# Patient Record
Sex: FEMALE | Race: BLACK OR AFRICAN AMERICAN | Employment: UNEMPLOYED | URBAN - METROPOLITAN AREA
[De-identification: names, ages, dates, MRNs, and addresses within clinical notes are randomized per-mention and may not be internally consistent; named-entity substitution may affect disease eponyms.]

---

## 2018-03-07 ENCOUNTER — OFFICE VISIT (OUTPATIENT)
Dept: SURGERY | Age: 68
End: 2018-03-07

## 2018-03-07 VITALS
HEIGHT: 68 IN | SYSTOLIC BLOOD PRESSURE: 167 MMHG | DIASTOLIC BLOOD PRESSURE: 85 MMHG | WEIGHT: 293 LBS | TEMPERATURE: 99 F | HEART RATE: 89 BPM | OXYGEN SATURATION: 100 % | BODY MASS INDEX: 44.41 KG/M2

## 2018-03-07 DIAGNOSIS — N18.6 CKD (CHRONIC KIDNEY DISEASE) STAGE V REQUIRING CHRONIC DIALYSIS (HCC): Primary | ICD-10-CM

## 2018-03-07 DIAGNOSIS — Z99.2 CKD (CHRONIC KIDNEY DISEASE) STAGE V REQUIRING CHRONIC DIALYSIS (HCC): Primary | ICD-10-CM

## 2018-03-07 PROBLEM — E66.01 OBESITY, MORBID (HCC): Status: ACTIVE | Noted: 2018-03-07

## 2018-03-07 RX ORDER — ROSUVASTATIN CALCIUM 10 MG/1
10 TABLET, COATED ORAL
COMMUNITY

## 2018-03-07 RX ORDER — FUROSEMIDE 80 MG/1
80 TABLET ORAL DAILY
COMMUNITY

## 2018-03-07 RX ORDER — ACETAMINOPHEN 325 MG/1
650 TABLET ORAL
COMMUNITY

## 2018-03-07 RX ORDER — LANOLIN ALCOHOL/MO/W.PET/CERES
CREAM (GRAM) TOPICAL
COMMUNITY
End: 2020-09-28 | Stop reason: ALTCHOICE

## 2018-03-07 RX ORDER — GABAPENTIN 100 MG/1
CAPSULE ORAL 3 TIMES DAILY
COMMUNITY

## 2018-03-07 RX ORDER — GUAIFENESIN 100 MG/5ML
200 SOLUTION ORAL
COMMUNITY

## 2018-03-07 RX ORDER — HYDRALAZINE HYDROCHLORIDE 100 MG/1
TABLET, FILM COATED ORAL 2 TIMES DAILY
COMMUNITY

## 2018-03-07 RX ORDER — CLONIDINE HYDROCHLORIDE 0.1 MG/1
TABLET ORAL 2 TIMES DAILY
COMMUNITY
End: 2020-09-28 | Stop reason: ALTCHOICE

## 2018-03-07 RX ORDER — MIRTAZAPINE 15 MG/1
TABLET, FILM COATED ORAL
COMMUNITY
End: 2019-02-07 | Stop reason: CLARIF

## 2018-03-07 RX ORDER — ASPIRIN 81 MG/1
TABLET ORAL DAILY
COMMUNITY
End: 2020-09-28 | Stop reason: ALTCHOICE

## 2018-03-07 RX ORDER — CLOPIDOGREL BISULFATE 75 MG/1
TABLET ORAL
COMMUNITY
End: 2019-02-07 | Stop reason: CLARIF

## 2018-03-07 RX ORDER — NYSTATIN 100000 [USP'U]/G
POWDER TOPICAL 4 TIMES DAILY
COMMUNITY
End: 2019-02-07 | Stop reason: CLARIF

## 2018-03-07 RX ORDER — ALBUTEROL SULFATE 0.83 MG/ML
SOLUTION RESPIRATORY (INHALATION) ONCE
COMMUNITY
End: 2020-09-28 | Stop reason: ALTCHOICE

## 2018-03-07 RX ORDER — FACIAL-BODY WIPES
10 EACH TOPICAL DAILY
COMMUNITY
End: 2020-09-28 | Stop reason: CLARIF

## 2018-03-07 RX ORDER — CARVEDILOL 6.25 MG/1
12.5 TABLET ORAL 2 TIMES DAILY WITH MEALS
COMMUNITY

## 2018-03-07 RX ORDER — ESOMEPRAZOLE MAGNESIUM 40 MG/1
CAPSULE, DELAYED RELEASE ORAL DAILY
COMMUNITY
End: 2019-02-07 | Stop reason: CLARIF

## 2018-03-07 RX ORDER — INSULIN GLARGINE 100 [IU]/ML
12 INJECTION, SOLUTION SUBCUTANEOUS
Status: ON HOLD | COMMUNITY
End: 2018-12-12 | Stop reason: SDUPTHER

## 2018-03-07 RX ORDER — DOCUSATE SODIUM 100 MG/1
100 CAPSULE, LIQUID FILLED ORAL 2 TIMES DAILY
COMMUNITY
End: 2019-02-07 | Stop reason: CLARIF

## 2018-03-07 NOTE — MR AVS SNAPSHOT
[address] [place] [address] 
[phone] Patient: Elvira Mann MRN: [id number] YMB:[id number] Visit Information Date & Time Provider Department Dept. Phone Encounter #  
 3/7/2018 10:00 AM Mario Velazquez MD [facility] [address] [phone] [id number] Allergies as of 3/7/2018  Never Reviewed Severity Noted Reaction Type Reactions Sulfadiazine  03/07/2018    Other (comments) Current Immunizations  Never Reviewed No immunizations on file. Not reviewed this visit Vitals BP Pulse Temp Height(growth percentile) Weight(growth percentile) SpO2  
 167/85 (BP 1 Location: Right arm, BP Patient Position: Sitting) 89 99 °F (37.2 °C) 5' 7.99\" (1.727 m) 315 lb (142.9 kg) 100% BMI Smoking Status 47.91 kg/m2 Never Assessed Vitals History BMI and BSA Data Body Mass Index Body Surface Area  
 47.91 kg/m 2 2.62 m 2 Your Updated Medication List  
  
   
This list is accurate as of 3/7/18 12:08 PM.  Always use your most recent med list.  
  
  
  
  
 acetaminophen 325 mg tablet Commonly known as:  TYLENOL Take 650 mg by mouth every four (4) hours as needed for Pain. albuterol 2.5 mg /3 mL (0.083 %) nebulizer solution Commonly known as:  PROVENTIL VENTOLIN  
by Nebulization route once. alteplase 2 mg injection Commonly known as:  CATHFLO  
by InterCATHeter route once. aspirin delayed-release 81 mg tablet Take  by mouth daily. bisacodyl 10 mg suppository Commonly known as:  DULCOLAX Insert 10 mg into rectum daily. carvedilol 6.25 mg tablet Commonly known as:  [place] Members Take  by mouth two (2) times daily (with meals). cloNIDine HCl 0.1 mg tablet Commonly known as:  CATAPRES Take  by mouth two (2) times a day. clopidogrel 75 mg Tab Commonly known as:  PLAVIX Take  by mouth. docusate sodium 100 mg capsule Commonly known as:  Carletha Benne Take 100 mg by mouth two (2) times a day. esomeprazole 40 mg capsule Commonly known as:  Deadra Martyr Take  by mouth daily. ferrous sulfate 325 mg (65 mg iron) tablet Take  by mouth Daily (before breakfast). furosemide 80 mg tablet Commonly known as:  LASIX Take  by mouth daily. gabapentin 100 mg capsule Commonly known as:  NEURONTIN Take  by mouth three (3) times daily. guaiFENesin 100 mg/5 mL liquid Commonly known as:  ROBITUSSIN Take 200 mg by mouth three (3) times daily as needed for Cough. hydrALAZINE 100 mg tablet Commonly known as:  APRESOLINE Take  by mouth. insulin glargine 100 unit/mL injection Commonly known as:  LANTUS  
20 Units by SubCUTAneous route nightly. INSULIN LISPRO SC  
10 Units by SubCUTAneous route three (3) times daily. ipratropium 17 mcg/actuation inhaler Commonly known as:  ATROVENT HFA Take 1 Puff by inhalation. mirtazapine 15 mg tablet Commonly known as:  Claria Chick Take  by mouth nightly. nystatin powder Commonly known as:  MYCOSTATIN Apply  to affected area four (4) times daily. rosuvastatin 10 mg tablet Commonly known as:  CRESTOR Take 10 mg by mouth nightly.  
  
 sodium chloride-aloe vera topical gel Commonly known as:  AYR Apply  to affected area once. Introducing [facility]! Yumiko Ray introduces [employer] patient portal. Now you can access parts of your medical record, email your doctor's office, and request medication refills online. 1. In your internet browser, go to [email or web address] 2. Click on the First Time User? Click Here link in the Sign In box. You will see the New Member Sign Up page. 3. Enter your [employer] Access Code exactly as it appears below. You will not need to use this code after youve completed the sign-up process.  If you do not sign up before the expiration date, you must request a new code. · [employer] Access Code: [email or web address] Expires: 6/5/2018 12:08 PM 
 
4. Enter the last four digits of your Social Security Number (xxxx) and Date of Birth (mm/dd/yyyy) as indicated and click Submit. You will be taken to the next sign-up page. 5. Create a [employer] ID. This will be your [employer] login ID and cannot be changed, so think of one that is secure and easy to remember. 6. Create a [employer] password. You can change your password at any time. 7. Enter your Password Reset Question and Answer. This can be used at a later time if you forget your password. 8. Enter your e-mail address. You will receive e-mail notification when new information is available in [address] 9. Click Sign Up. You can now view and download portions of your medical record. 10. Click the Download Summary menu link to download a portable copy of your medical information. If you have questions, please visit the Frequently Asked Questions section of the [employer] website. Remember, [employer] is NOT to be used for urgent needs. For medical emergencies, dial 911. Now available from your iPhone and Android! Please provide this summary of care documentation to your next provider. If you have any questions after today's visit, please call [phone].

## 2018-03-07 NOTE — PROGRESS NOTES
Neli Sherman  3/7/18      The patient is a 75 y.o. woman referred by Arturo Irby MD regarding dialysis access. The patient is presently dialyzed by way of a central catheter. The patient has a history of congestive heart failure, diabetes mellitus, hypertension. The patient was on a stretcher when I saw her. She reports that she is unable to lie down flat. She sleeps in a partially sitting up position. She does use home oxygen. Her shortness of breath when lying down flat is not improved after dialysis. The patient has seen a cardiologist Dr. Caroline Pichardo, telephone [phone]. Physical Examination:   GENERAL:  Patient is an extremely obese woman. Visit Vitals    /85 (BP 1 Location: Right arm, BP Patient Position: Sitting)    Pulse 89    Temp 99 °F (37.2 °C)    Ht 5' 7.99\" (1.727 m)    Wt 142.9 kg (315 lb)    SpO2 100%  Comment: O2 2LPM    BMI 47.91 kg/m2   HEAD/NECK:  No jaundice, mass or thyromegaly. There is a right sided central catheter which appears to have right internal jugular vein entry. LUNGS:  Clear bilaterally without wheeze, rhonchi or rales, but sounds are somewhat distant. HEART:  Regular without murmur, gallop or rub. NEURO:  Patient is alert and oriented.     EXTREMITIES: Right and left radial and ulnar pulses were 2+. Palmar arches intact to doppler exam bilaterally. Duplex ultrasound was obtained of the upper extremity veins for dialysis access planning. On the right side, there is a small median antebrachial vein which is joined by a small deep tributary. It gives off a right cephalic vein into the upper arm which is 2.1 mm diameter. The basilic tributary is initially 2.8 mm diameter passing anterior to the brachial artery. It then becomes slightly smaller in diameter before joining a more posterior tributary to form the basilic vein trunk. The basilic vein on the right is approximately 2 mm in diameter.  On the right at the antecubital there is a brachial vein which lies lateral to the brachial artery and is 5 mm diameter. It passes anterior and then medial to the brachial artery proceeding up the upper arm and is 4.9 mm in diameter in the lower third of the upper arm and is 5.7 mm diameter most proximally. On the left side median antebrachial vein is 2.6 mm diameter. It is joined by a small deep tributary and gives off basilic tributary which is 3.1 mm diameter and cephalic vein which is 3.3 mm diameter. The left cephalic vein in the upper arm is 4.1 mm diameter. The left basilic tributary passing anterior to the brachial artery is 3.0 mm diameter. It joins a 3.6 mm posterior tributary to form the basilic vein trunk. The basilic vein is 4.5 mm diameter in its mid portion and 5.0 mm diameter more proximally. With respect to anatomy, the patient's left arm veins appear to be more suitable for creation of arteriovenous fistula. I reviewed with the patient and her family options for hemodialysis access which would include central catheter, arteriovenous fistula and arteriovenous graft. The ideal access would be an arteriovenous fistula as it represents the best opportunity for longevity of the access and minimal complications, thrombotic and infectious. This procedure is her arm related to her anatomy would require a two-step process, first for creation of a fistula and second, several months later for transposition. Surgery, in particular, the transposition surgery, would require general anesthesia. Arteriovenous graft insertion could be done in the left arm. This would require typically a single operation and could be utilized about three weeks after surgery. However, the AV grafts have considerably higher risk of thrombosis and require more frequent intervention and have shorter total duration of use.      The patient has a central vein catheter, but long term use of catheters has the highest risk of infection and has additional risk of central vein stenosis or occlusion.      I have some concerns about the patient's ability to tolerate anesthesia. I will discuss her general health status with her nephrologist before making final recommendation to the patient. The patient, I think, is concerned about risk regarding general anesthesia and potential need for more than one surgical operation and for future interventions on any access.      I reviewed with the patient the typical operative and post operative course for creation of AV fistula or insertion of arteriovenous graft. Risks versus benefits were reviewed with the patient of these procedures. Risks of surgery include bleeding, infection, failure of the access, ischemia of the hand, swelling of the arm, injury to vessels or nerves, risk of anesthesia, etc. The patient understands and will await further discussion before deciding on her preferred access.      Final Diagnosis: End-stage renal disease.     Jennifer/jarett                                                                                    cc:  Basim Mason MD

## 2018-03-07 NOTE — PROGRESS NOTES
1. Have you been to the ER, urgent care clinic since your last visit? Hospitalized since your last visit? New patient2. Have you seen or consulted any other health care providers outside of the [facility] since your last visit? Include any pap smears or colon screening. No.    Does not have advanced directive.

## 2018-03-29 PROBLEM — N18.6 CKD (CHRONIC KIDNEY DISEASE) STAGE V REQUIRING CHRONIC DIALYSIS (HCC): Status: ACTIVE | Noted: 2018-03-29

## 2018-03-29 PROBLEM — Z99.2 CKD (CHRONIC KIDNEY DISEASE) STAGE V REQUIRING CHRONIC DIALYSIS (HCC): Status: ACTIVE | Noted: 2018-03-29

## 2018-04-05 ENCOUNTER — TELEPHONE (OUTPATIENT)
Dept: SURGERY | Age: 68
End: 2018-04-05

## 2018-11-19 ENCOUNTER — OFFICE VISIT (OUTPATIENT)
Dept: SURGERY | Age: 68
End: 2018-11-19

## 2018-11-19 VITALS
HEIGHT: 68 IN | SYSTOLIC BLOOD PRESSURE: 171 MMHG | TEMPERATURE: 98.1 F | DIASTOLIC BLOOD PRESSURE: 84 MMHG | OXYGEN SATURATION: 95 % | BODY MASS INDEX: 44.41 KG/M2 | WEIGHT: 293 LBS | HEART RATE: 87 BPM

## 2018-11-19 DIAGNOSIS — N18.6 CKD (CHRONIC KIDNEY DISEASE) STAGE V REQUIRING CHRONIC DIALYSIS (HCC): Primary | ICD-10-CM

## 2018-11-19 DIAGNOSIS — Z99.2 CKD (CHRONIC KIDNEY DISEASE) STAGE V REQUIRING CHRONIC DIALYSIS (HCC): Primary | ICD-10-CM

## 2018-11-19 RX ORDER — LANSOPRAZOLE 30 MG/1
30 CAPSULE, DELAYED RELEASE ORAL
COMMUNITY
End: 2020-09-28 | Stop reason: ALTCHOICE

## 2018-11-19 RX ORDER — TRAMADOL HYDROCHLORIDE 50 MG/1
50 TABLET ORAL
COMMUNITY
End: 2020-09-28 | Stop reason: ALTCHOICE

## 2018-11-19 NOTE — PROGRESS NOTES
The patient is a 70 y.o. woman who was last seen on 3/17/18 related to dialysis access. She was referred by Dr. Fabiano Barnes. The patient since that visit has been evaluated by her pulmonologist on 11/9/18 and by her cardiologist on 9/7/18. She was felt to have intermediate cardiac risk and to be an acceptable risk from a pulmonary standpoint for the surgery. The patient does report that she has shortness of breath when she attempts to lie down, even if she is lying on her side. She does use oxygen at home at night. The patient denies anginal chest pain. Physical Examination:   Visit Vitals  /84 (BP 1 Location: Left arm, BP Patient Position: Sitting)   Pulse 87   Temp 98.1 °F (36.7 °C)   Ht 5' 7.99\" (1.727 m)   Wt 133.8 kg (295 lb)   SpO2 95%   BMI 44.87 kg/m²   GENERAL:  Alert overweight woman in no acute distress. EXTREMITIES: Examination of the left upper extremity reveals 2+ radial and ulnar pulses. I repeated the duplex ultrasound evaluation of the veins of the left upper extremity. There is a small median antebrachial vein 2.1 mm diameter which gives off a cephalic vein also 2.1 mm diameter and a basilic tributary which initially is 2.8 mm diameter passing anterior to the brachial artery. It becomes slightly smaller in diameter before joining a more posterior tributary to form the basilic vein trunk. The basilic vein trunk on the left distally was 4 mm diameter and more proximally was 5 mm diameter. I would plan to reassess the veins with ultrasound at surgery after general anesthesia. I would then make a determination as to whether to proceed with creation of arteriovenous fistula, which would be the first of two staged operations or whether to proceed with insertion of an arteriovenous graft. I reviewed with the patient the two alternatives for arteriovenous access in the arm and the factors in deciding which to employ.      I spoke with Dr. Rohith Mccarthy today and she confirmed that she felt that arteriovenous access surgery in the upper extremity was warranted. I explained to the patient that I think she does have elevated operative risk related to her heart and lung function. Risks versus benefits were reviewed with the patient. Risks of surgery include bleeding, infection, failure of the fistula or graft, ischemia of the hand or arm, swelling of the hand or arm, injury to vessels or nerves, risk of anesthesia including risk of respiratory failure, myocardial infarction, stroke, etc. The patient understands and wishes to proceed. Final Diagnosis:  End-stage renal disease. c:Chucky Barnes MD    Total Evaluation and Management time utilized (excluding any procedure time)  was 28 minutes, with 55 % in counseling and/or coordination of care.     Elliott Perez MD

## 2018-11-20 ENCOUNTER — TELEPHONE (OUTPATIENT)
Dept: SURGERY | Age: 68
End: 2018-11-20

## 2018-11-20 NOTE — TELEPHONE ENCOUNTER
Spoke with Ms Eric Hernandez. Pt gave verbal permission to speak with son, Lesli Arrington. Surgery scheduled for Friday, 12/7/18 at 10:00 am, arrival time is 8:00 am.  Pt will take last dose of Plavix & aspirin on Saturday, 12/1/18. Faxed surgery information & post op appt to dialysis at (f) [phone], confirmation received. Dialysis will arrange transportation for pt.

## 2018-12-05 NOTE — PERIOP NOTES
[facility]  Preoperative Instructions        Surgery Date 12/7/18          Time of Arrival 8:00AM     1. On the day of your surgery, please report to the Surgical Services Registration Desk and sign in at your designated time. The Surgery Center is located to the right of the Emergency Room. 2. You must have someone with you to drive you home. You should not drive a car for 24 hours following surgery. Please make arrangements for a friend or family member to stay with you for the first 24 hours after your surgery. 3. Do not have anything to eat or drink (including water, gum, mints, coffee, juice) after midnight 12/6/18 . ? This may not apply to medications prescribed by your physician. ?(Please note below the special instructions with medications to take the morning of your procedure.)    4. We recommend you do not drink any alcoholic beverages for 24 hours before and after your surgery. 5. Contact your surgeons office for instructions on the following medications: non-steroidal anti-inflammatory drugs (i.e. Advil, Aleve), vitamins, and supplements. (Some surgeons will want you to stop these medications prior to surgery and others may allow you to take them)  **If you are currently taking Plavix, Coumadin, Aspirin and/or other blood-thinning agents, contact your surgeon for instructions. ** Your surgeon will partner with the physician prescribing these medications to determine if it is safe to stop or if you need to continue taking. Please do not stop taking these medications without instructions from your surgeon    6. Wear comfortable clothes. Wear glasses instead of contacts. Do not bring any money or jewelry. Please bring picture ID, insurance card, and any prearranged co-payment or hospital payment. Do not wear make-up, particularly mascara the morning of your surgery. Do not wear nail polish, particularly if you are having foot /hand surgery.   Wear your hair loose or down, no ponytails, buns, vanessa pins or clips. All body piercings must be removed. Please shower with antibacterial soap for three consecutive days before and on the morning of surgery, but do not apply any lotions, powders or deodorants after the shower on the day of surgery. Please use a fresh towels after each shower. Please sleep in clean clothes and change bed linens the night before surgery. Please do not shave for 48 hours prior to surgery. Shaving of the face is acceptable. 7. You should understand that if you do not follow these instructions your surgery may be cancelled. If your physical condition changes (I.e. fever, cold or flu) please contact your surgeon as soon as possible. 8. It is important that you be on time. If a situation occurs where you may be late, please call [phone] (OR Holding Area). 9. If you have any questions and or problems, please call [phone] (Pre-admission Testing). 10. Your surgery time may be subject to change. You will receive a phone call the evening prior if your time changes. 11.  If having outpatient surgery, you must have someone to drive you here, stay with you during the duration of your stay, and to drive you home at time of discharge. 12.   In an effort to improve the efficiency, privacy, and safety for all of our Pre-op patients visitors are not allowed in the Holding area. Once you arrive and are registered your family/visitors will be asked to remain in the waiting room. The Pre-op staff will get you from the Surgical Waiting Area and will explain to you and your family/visitors that the Pre-op phase is beginning. The staff will answer any questions and provide instructions for tracking of the patient, by use of the existing tracking number and color-coded status board in the waiting room.   At this time the staff will also ask for your designated spokesperson information in the event that the physician or staff need to provide an update or obtain any pertinent information. The designated spokesperson will be notified if the physician needs to speak to family during the pre-operative phase. If at any time your family/visitors has questions or concerns they may approach the volunteer desk in the waiting area for assistance. Special Instructions:  Pt held Plavix (12/1/18) as directed by surgeon. Pt stopped Aspirin on 12/4/18. Dr Keven Mcintosh office called and made Kerri Sanchez aware pt did not stop Aspirin on 12/1, Kerri Sanchez to make Dr Ayde Esquivel aware and call PAT w any changes. Nebulizer/inhaler tx as needed    Call holding with any questions regarding blood sugar    MEDICATIONS TO TAKE THE MORNING OF SURGERY WITH A SIP OF WATER: Coreg, Clonidine, Lasix, Hydralazine. Also Nexium,  Gabapentin,Tylenol, Ultram, Prevacid as needed      I understand a pre-operative phone call will be made to verify my surgery time. In the event that I am not available, I give permission for a message to be left on my answering service and/or with another person?   Yes          ___________________      __________   _________    (Signature of Patient)             (Witness)                (Date and Time)

## 2018-12-05 NOTE — PERIOP NOTES
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      Zane Williamson to consult Dr Jorge Langley and call SUHAIL to advise

## 2018-12-06 NOTE — PERIOP NOTES
Hernandez Carrier called back regarding patients history of MRSA. Telephone ordered received and read by for vancomycin 1.5gms vancomycin iv to be give in preop.

## 2018-12-07 ENCOUNTER — ANESTHESIA EVENT (OUTPATIENT)
Dept: SURGERY | Age: 68
End: 2018-12-07
Payer: MEDICARE

## 2018-12-07 ENCOUNTER — HOSPITAL ENCOUNTER (OUTPATIENT)
Age: 68
Setting detail: OBSERVATION
Discharge: HOME | End: 2018-12-12
Attending: SURGERY | Admitting: INTERNAL MEDICINE
Payer: MEDICARE

## 2018-12-07 ENCOUNTER — ANESTHESIA (OUTPATIENT)
Dept: SURGERY | Age: 68
End: 2018-12-07
Payer: MEDICARE

## 2018-12-07 ENCOUNTER — APPOINTMENT (OUTPATIENT)
Dept: GENERAL RADIOLOGY | Age: 68
End: 2018-12-07
Attending: INTERNAL MEDICINE
Payer: MEDICARE

## 2018-12-07 DIAGNOSIS — Z99.2 CKD (CHRONIC KIDNEY DISEASE) STAGE V REQUIRING CHRONIC DIALYSIS (HCC): Primary | ICD-10-CM

## 2018-12-07 DIAGNOSIS — N18.6 CKD (CHRONIC KIDNEY DISEASE) STAGE V REQUIRING CHRONIC DIALYSIS (HCC): Primary | ICD-10-CM

## 2018-12-07 PROBLEM — R73.9 HYPERGLYCEMIA: Status: ACTIVE | Noted: 2018-12-07

## 2018-12-07 LAB
ALBUMIN SERPL-MCNC: 3.2 G/DL (ref 3.5–5)
ANION GAP SERPL CALC-SCNC: 5 MMOL/L (ref 5–15)
BUN SERPL-MCNC: 40 MG/DL (ref 6–20)
BUN/CREAT SERPL: 9 (ref 12–20)
CALCIUM SERPL-MCNC: 8.2 MG/DL (ref 8.5–10.1)
CHLORIDE SERPL-SCNC: 93 MMOL/L (ref 97–108)
CO2 SERPL-SCNC: 31 MMOL/L (ref 21–32)
CREAT SERPL-MCNC: 4.5 MG/DL (ref 0.55–1.02)
ERYTHROCYTE [DISTWIDTH] IN BLOOD BY AUTOMATED COUNT: 14.9 % (ref 11.5–14.5)
GLUCOSE BLD STRIP.AUTO-MCNC: 340 MG/DL (ref 65–100)
GLUCOSE BLD STRIP.AUTO-MCNC: 399 MG/DL (ref 65–100)
GLUCOSE BLD STRIP.AUTO-MCNC: 447 MG/DL (ref 65–100)
GLUCOSE BLD STRIP.AUTO-MCNC: 453 MG/DL (ref 65–100)
GLUCOSE BLD STRIP.AUTO-MCNC: 465 MG/DL (ref 65–100)
GLUCOSE BLD STRIP.AUTO-MCNC: 476 MG/DL (ref 65–100)
GLUCOSE BLD STRIP.AUTO-MCNC: 485 MG/DL (ref 65–100)
GLUCOSE BLD STRIP.AUTO-MCNC: 518 MG/DL (ref 65–100)
GLUCOSE SERPL-MCNC: 477 MG/DL (ref 65–100)
HCT VFR BLD AUTO: 32.7 % (ref 35–47)
HGB BLD-MCNC: 10.3 G/DL (ref 11.5–16)
MCH RBC QN AUTO: 29.3 PG (ref 26–34)
MCHC RBC AUTO-ENTMCNC: 31.5 G/DL (ref 30–36.5)
MCV RBC AUTO: 92.9 FL (ref 80–99)
NRBC # BLD: 0 K/UL (ref 0–0.01)
NRBC BLD-RTO: 0 PER 100 WBC
PHOSPHATE SERPL-MCNC: 4.3 MG/DL (ref 2.6–4.7)
PLATELET # BLD AUTO: 191 K/UL (ref 150–400)
PMV BLD AUTO: 11.3 FL (ref 8.9–12.9)
POTASSIUM SERPL-SCNC: 4.4 MMOL/L (ref 3.5–5.1)
RBC # BLD AUTO: 3.52 M/UL (ref 3.8–5.2)
SERVICE CMNT-IMP: ABNORMAL
SODIUM SERPL-SCNC: 129 MMOL/L (ref 136–145)
WBC # BLD AUTO: 6.3 K/UL (ref 3.6–11)

## 2018-12-07 PROCEDURE — G0378 HOSPITAL OBSERVATION PER HR: HCPCS

## 2018-12-07 PROCEDURE — 77030033269 HC SLV COMPR SCD KNE2 CARD -B

## 2018-12-07 PROCEDURE — 80069 RENAL FUNCTION PANEL: CPT

## 2018-12-07 PROCEDURE — 96372 THER/PROPH/DIAG INJ SC/IM: CPT

## 2018-12-07 PROCEDURE — 71045 X-RAY EXAM CHEST 1 VIEW: CPT

## 2018-12-07 PROCEDURE — 74011250636 HC RX REV CODE- 250/636

## 2018-12-07 PROCEDURE — 82962 GLUCOSE BLOOD TEST: CPT

## 2018-12-07 PROCEDURE — 74011636637 HC RX REV CODE- 636/637: Performed by: INTERNAL MEDICINE

## 2018-12-07 PROCEDURE — 74011250636 HC RX REV CODE- 250/636: Performed by: ANESTHESIOLOGY

## 2018-12-07 PROCEDURE — 36415 COLL VENOUS BLD VENIPUNCTURE: CPT

## 2018-12-07 PROCEDURE — 74011250636 HC RX REV CODE- 250/636: Performed by: INTERNAL MEDICINE

## 2018-12-07 PROCEDURE — 99218 HC RM OBSERVATION: CPT

## 2018-12-07 PROCEDURE — 80047 BASIC METABLC PNL IONIZED CA: CPT

## 2018-12-07 PROCEDURE — 74011636637 HC RX REV CODE- 636/637: Performed by: ANESTHESIOLOGY

## 2018-12-07 PROCEDURE — 85027 COMPLETE CBC AUTOMATED: CPT

## 2018-12-07 PROCEDURE — 74011250637 HC RX REV CODE- 250/637: Performed by: INTERNAL MEDICINE

## 2018-12-07 RX ORDER — VANCOMYCIN/0.9 % SOD CHLORIDE 1.5G/250ML
1500 PLASTIC BAG, INJECTION (ML) INTRAVENOUS ONCE
Status: DISCONTINUED | OUTPATIENT
Start: 2018-12-07 | End: 2018-12-07

## 2018-12-07 RX ORDER — SODIUM CHLORIDE 0.9 % (FLUSH) 0.9 %
5-10 SYRINGE (ML) INJECTION AS NEEDED
Status: DISCONTINUED | OUTPATIENT
Start: 2018-12-07 | End: 2018-12-12 | Stop reason: HOSPADM

## 2018-12-07 RX ORDER — CEFAZOLIN SODIUM 1 G/3ML
3 INJECTION, POWDER, FOR SOLUTION INTRAMUSCULAR; INTRAVENOUS ONCE
Status: DISCONTINUED | OUTPATIENT
Start: 2018-12-07 | End: 2018-12-07 | Stop reason: SDUPTHER

## 2018-12-07 RX ORDER — SODIUM CHLORIDE 9 MG/ML
25 INJECTION, SOLUTION INTRAVENOUS CONTINUOUS
Status: DISCONTINUED | OUTPATIENT
Start: 2018-12-07 | End: 2018-12-10

## 2018-12-07 RX ORDER — MAGNESIUM SULFATE 100 %
4 CRYSTALS MISCELLANEOUS AS NEEDED
Status: DISCONTINUED | OUTPATIENT
Start: 2018-12-07 | End: 2018-12-12 | Stop reason: HOSPADM

## 2018-12-07 RX ORDER — SODIUM CHLORIDE 0.9 % (FLUSH) 0.9 %
5-10 SYRINGE (ML) INJECTION EVERY 8 HOURS
Status: DISCONTINUED | OUTPATIENT
Start: 2018-12-07 | End: 2018-12-12 | Stop reason: HOSPADM

## 2018-12-07 RX ORDER — CARVEDILOL 6.25 MG/1
6.25 TABLET ORAL 2 TIMES DAILY WITH MEALS
Status: DISCONTINUED | OUTPATIENT
Start: 2018-12-07 | End: 2018-12-12 | Stop reason: HOSPADM

## 2018-12-07 RX ORDER — DEXTROSE 50 % IN WATER (D50W) INTRAVENOUS SYRINGE
12.5-25 AS NEEDED
Status: DISCONTINUED | OUTPATIENT
Start: 2018-12-07 | End: 2018-12-12 | Stop reason: HOSPADM

## 2018-12-07 RX ORDER — ONDANSETRON 2 MG/ML
4 INJECTION INTRAMUSCULAR; INTRAVENOUS
Status: DISCONTINUED | OUTPATIENT
Start: 2018-12-07 | End: 2018-12-12 | Stop reason: HOSPADM

## 2018-12-07 RX ORDER — GABAPENTIN 100 MG/1
100 CAPSULE ORAL 3 TIMES DAILY
Status: DISCONTINUED | OUTPATIENT
Start: 2018-12-07 | End: 2018-12-12 | Stop reason: HOSPADM

## 2018-12-07 RX ORDER — HYDRALAZINE HYDROCHLORIDE 50 MG/1
50 TABLET, FILM COATED ORAL 2 TIMES DAILY
Status: DISCONTINUED | OUTPATIENT
Start: 2018-12-07 | End: 2018-12-12 | Stop reason: HOSPADM

## 2018-12-07 RX ORDER — ASPIRIN 81 MG/1
81 TABLET ORAL DAILY
Status: DISCONTINUED | OUTPATIENT
Start: 2018-12-08 | End: 2018-12-12 | Stop reason: HOSPADM

## 2018-12-07 RX ORDER — INSULIN GLARGINE 100 [IU]/ML
10 INJECTION, SOLUTION SUBCUTANEOUS
Status: DISCONTINUED | OUTPATIENT
Start: 2018-12-07 | End: 2018-12-07

## 2018-12-07 RX ORDER — INSULIN GLARGINE 100 [IU]/ML
12 INJECTION, SOLUTION SUBCUTANEOUS
Status: DISCONTINUED | OUTPATIENT
Start: 2018-12-07 | End: 2018-12-11

## 2018-12-07 RX ORDER — HEPARIN SODIUM 5000 [USP'U]/ML
5000 INJECTION, SOLUTION INTRAVENOUS; SUBCUTANEOUS EVERY 12 HOURS
Status: DISCONTINUED | OUTPATIENT
Start: 2018-12-07 | End: 2018-12-07

## 2018-12-07 RX ORDER — HEPARIN SODIUM 5000 [USP'U]/ML
7500 INJECTION, SOLUTION INTRAVENOUS; SUBCUTANEOUS EVERY 12 HOURS
Status: DISCONTINUED | OUTPATIENT
Start: 2018-12-07 | End: 2018-12-12 | Stop reason: HOSPADM

## 2018-12-07 RX ORDER — INSULIN LISPRO 100 [IU]/ML
INJECTION, SOLUTION INTRAVENOUS; SUBCUTANEOUS
Status: DISCONTINUED | OUTPATIENT
Start: 2018-12-07 | End: 2018-12-12 | Stop reason: HOSPADM

## 2018-12-07 RX ORDER — CLONIDINE HYDROCHLORIDE 0.1 MG/1
0.1 TABLET ORAL 2 TIMES DAILY
Status: DISCONTINUED | OUTPATIENT
Start: 2018-12-07 | End: 2018-12-12 | Stop reason: HOSPADM

## 2018-12-07 RX ORDER — ACETAMINOPHEN 325 MG/1
650 TABLET ORAL
Status: DISCONTINUED | OUTPATIENT
Start: 2018-12-07 | End: 2018-12-12 | Stop reason: HOSPADM

## 2018-12-07 RX ORDER — CLOPIDOGREL BISULFATE 75 MG/1
75 TABLET ORAL DAILY
Status: DISCONTINUED | OUTPATIENT
Start: 2018-12-08 | End: 2018-12-12 | Stop reason: HOSPADM

## 2018-12-07 RX ADMIN — CARVEDILOL 6.25 MG: 6.25 TABLET, FILM COATED ORAL at 17:25

## 2018-12-07 RX ADMIN — SODIUM CHLORIDE 25 ML/HR: 900 INJECTION, SOLUTION INTRAVENOUS at 09:00

## 2018-12-07 RX ADMIN — HUMAN INSULIN 10 UNITS: 100 INJECTION, SOLUTION SUBCUTANEOUS at 10:11

## 2018-12-07 RX ADMIN — HEPARIN SODIUM 7500 UNITS: 5000 INJECTION INTRAVENOUS; SUBCUTANEOUS at 21:24

## 2018-12-07 RX ADMIN — Medication 10 ML: at 21:25

## 2018-12-07 RX ADMIN — INSULIN LISPRO 6 UNITS: 100 INJECTION, SOLUTION INTRAVENOUS; SUBCUTANEOUS at 21:23

## 2018-12-07 RX ADMIN — GABAPENTIN 100 MG: 100 CAPSULE ORAL at 17:25

## 2018-12-07 RX ADMIN — HUMAN INSULIN 10 UNITS: 100 INJECTION, SOLUTION SUBCUTANEOUS at 09:00

## 2018-12-07 RX ADMIN — GABAPENTIN 100 MG: 100 CAPSULE ORAL at 21:24

## 2018-12-07 RX ADMIN — INSULIN LISPRO 8 UNITS: 100 INJECTION, SOLUTION INTRAVENOUS; SUBCUTANEOUS at 17:24

## 2018-12-07 RX ADMIN — ACETAMINOPHEN 650 MG: 325 TABLET ORAL at 16:03

## 2018-12-07 RX ADMIN — ACETAMINOPHEN 650 MG: 325 TABLET ORAL at 21:23

## 2018-12-07 RX ADMIN — CLONIDINE HYDROCHLORIDE 0.1 MG: 0.1 TABLET ORAL at 17:25

## 2018-12-07 RX ADMIN — Medication 10 ML: at 15:57

## 2018-12-07 RX ADMIN — INSULIN GLARGINE 12 UNITS: 100 INJECTION, SOLUTION SUBCUTANEOUS at 19:13

## 2018-12-07 RX ADMIN — HYDRALAZINE HYDROCHLORIDE 50 MG: 50 TABLET, FILM COATED ORAL at 17:25

## 2018-12-07 NOTE — PROGRESS NOTES
Surgery    Patient found to have glucose over 500. Only declined to 400's with IV insulin. Cancel surgery. I will ask Hospitalist consult. She will likely require admission.     Noah Chavez MD

## 2018-12-07 NOTE — CONSULTS
David Schaeffer  ZOJ:[id number]   CXE:0/2/9893                    Chart reviewed. Will see patient shortly and arrange for dialysis to be done in AM.             Justin Menjivar [facility]  [facility] [address], Marily Settler  [place], [address]  Phone - [phone]         Fax - [phone] [facility]  [address] [place] [address]  Phone - [phone]        Fax - [phone]     [email or web address]

## 2018-12-07 NOTE — PROGRESS NOTES
[email or web address]                  Phone - [phone]   Renal Progress Note    Subjective:   Patient: Zeinab Zheng                    YOB: 1950               Date- 2018          Reason for visit: ESRF     Admission Date: 2018       PROBLEMS:    End-stage renal failure  --TTS at [facility] dialysis    Morbid obesity. Diabetes, uncontrolled. H/o hypertension. JAZMYN        PLAN:    Labs now. Dialysis in AM.      Orders written and d/w the patient, her daughter, and the dialysis RN. Complaint: none. Patient feeling well. She was due for AVF placement, but surgery was cancelled due to sugars>500. Review of Systems  A comprehensive review of systems was negative except for that written in the HPI. Objective:     Visit Vitals  BP 98/44 (BP 1 Location: Right arm, BP Patient Position: At rest)   Pulse 88   Temp 98.5 °F (36.9 °C)   Resp 18   Ht 5' 6\" (1.676 m)   Wt 142.8 kg (314 lb 13.1 oz) Comment: patient stated   SpO2 99%   Breastfeeding? No   BMI 50.81 kg/m²     Temp (24hrs), Av.7 °F (37.1 °C), Min:98.5 °F (36.9 °C), Max:98.9 °F (37.2 °C)      No intake/output data recorded. No intake/output data recorded. Physical Exam:  General:  alert, cooperative, no distress, morbidly obese  Neurologic:  grossly non-focal  Neck:  supple; no JVD  Lungs:  clear to auscultation bilaterally  Heart:  regular rate and rhythm  Skin:  no rash or abnormalities  Psych: normal affect and mood  Abdomen:  soft, non-tender. No masses,  no organomegaly   Extremities: no edema    Data Review:     No results for input(s): WBC, HGB, NA, K, CL, CO2, BUN, CREA, CA, ALB, PHOS, MG, HGBEXT in the last 72 hours. Chart reviewed. Pertinent Notes Reviewed. Medications list Personally Reviewed. Osmani Acosta, [address] [facility]  [facility] [address], [address], [address]  Phone - [phone]    Fax - [phone]  [email or web address] [facility]   [address] [facility] Aria Blanchard [address] [place] [address]  Phone - [phone]  Fax - [phone]. [facility]

## 2018-12-07 NOTE — PERIOP NOTES
46 - Dr. Renae Huerta called to make aware patients blood sugar is 518. Order received to give patient 10 units regular insulin via IV.    1859 - Attempting IV access on patient. 9999 - Dr. Annie Sandoval in to see patient. Dr. Annie Sandoval spoke with Dr. Renae Huerta and plan is to wait an hour to see if patients blood sugar responds to the IV insulin. Patient and daughter aware of plan. 4672 - patient comfortable in bed with call [place] in reach. Brandy Huerta to make him aware patients blood sugar was 447. Order received to give patient 10 units regular insulin via IV.     1123 - patient repositioned in bed and given water. 1230 - spoke with dietary to order patient a tray. Patient aware awaiting a bed for admission.

## 2018-12-07 NOTE — ANESTHESIA PREPROCEDURE EVALUATION
Anesthetic History No history of anesthetic complications Review of Systems / Medical History Patient summary reviewed, nursing notes reviewed and pertinent labs reviewed Pulmonary Sleep apnea: CPAP Shortness of breath Neuro/Psych Cardiovascular Hypertension Exercise tolerance: <4 METS 
  
GI/Hepatic/Renal 
  
 
 
Renal disease: ESRD and dialysis Endo/Other Diabetes Morbid obesity Other Findings Comments: Super-morbid obesity Physical Exam 
 
Airway Mallampati: II 
 
Neck ROM: normal range of motion Mouth opening: Normal 
 
 Cardiovascular Regular rate and rhythm,  S1 and S2 normal,  no murmur, click, rub, or gallop Dental 
 
Dentition: Poor dentition Comments: Only 2 lower teeth left Pulmonary Breath sounds clear to auscultation Abdominal 
GI exam deferred Other Findings Anesthetic Plan ASA: 3 Anesthesia type: general 
 
Monitoring Plan: BIS Induction: Intravenous Anesthetic plan and risks discussed with: Patient

## 2018-12-07 NOTE — H&P
Hospitalist Admission Note    NAME: Peterson Breaux   :  1950   MRN:  [id number]     Date/Time:  2018 12:23 PM    Patient PCP: King Neha MD  _____________________________________________________________________  Given the patient's current clinical presentation, I have a high level of concern for decompensation if discharged from the emergency department. Complex decision making was performed, which includes reviewing the patient's available past medical records, laboratory results, and x-ray films. My assessment of this patient's clinical condition and my plan of care is as follows. Assessment / Plan:    DM2, uncontrolled with hyperglycemia  - and down to 447 and 340 after regular insulin 10 units x 2 doses. Patient typically is on 12 units of basal lantus daily. She may overshoot so will hold off on restarting her lantus now and wait until tonight.    -will order premeal SSI prn and follow closely  -check A1c    Fever  -reported during dialysis yesterday? Currently afebrile and denies significant cough, vomiting, abd pain. Will monitor overnight and send blood cultures if she spikes temp  -check CXR. HTN  CHF?  -unknown EF. Continue home meds    JAZMYN  -on CPAP and prn oxygen at home    ESRD on TTS  -consult Nephrology for HD  -discussed with Dr SOUZA [facility]. Her surgery (left AV graft) has been cancelled and cannot be rescheduled for Monday. Disposition:  Will plan on DC home Saturday, after dialysis, once BG stable and no fever overnight. Code Status:   Full  Surrogate Decision Maker:  Daughter    DVT Prophylaxis:   Heparin SQ  GI Prophylaxis: not indicated    Baseline: . Lives with daughter. Uses walker / wheelchair           Subjective:   CHIEF COMPLAINT:   Elevated BG    HISTORY OF PRESENT ILLNESS:     Peterson Breaux is a 70 y.o.    female with a history of HTN, DM, JAZMYN and ESRD who I was asked to evaluate for BG >500 and recent fever. Patient is seen at the patient holding area. She had dialysis yesterday and last night she did not administer her usual 12 units of lantus. She was not clear why but sounds like she did give herself some humalog instead. She is scheduled for a left arm AV graft this morning and was found to have a . She was given 10 units IV humulin R and her subsequent . Her surgery was cancelled and we were asked to admit for work up and evaluation of the above problems. Patient also reported having some issue with her BG yesterday and that she had a fever also. Temp today ins 98.9. Denies vomiting, CP, SOB, abdominal pain or diarrhea. Past Medical History:   Diagnosis Date    Chronic kidney disease     [facility]est[facility] failure ([facility]     Diabetes ([facility]     Hypertension     Sleep apnea     CPAP        Past Surgical History:   Procedure Laterality Date    BREAST SURGERY PROCEDURE UNLISTED      R breast mass removed    HX CHOLECYSTECTOMY      HX HYSTERECTOMY      HX ORTHOPAEDIC      L big toe removed    HX ORTHOPAEDIC      R knee scope    HX OTHER SURGICAL      DIALYSIS CATHETER right neck       Social History     Tobacco Use    Smoking status: Never Smoker    Smokeless tobacco: Never Used   Substance Use Topics    Alcohol use: No     Frequency: Never        Family History   Problem Relation Age of Onset    Diabetes Father     Hypertension Father      Allergies   Allergen Reactions    Sulfadiazine Other (comments)        Prior to Admission medications    Medication Sig Start Date End Date Taking? Authorizing Provider   lansoprazole (PREVACID) 30 mg capsule Take 30 mg by mouth Daily (before breakfast). Yes Provider, Historical   sucroferric oxyhydroxide (VELPHORO) 500 mg chew chewable tablet Take  by mouth three (3) times daily (with meals).    Yes Provider, Historical   acetaminophen (TYLENOL) 325 mg tablet Take 650 mg by mouth every four (4) hours as needed for Pain. Yes Provider, Historical   albuterol (PROVENTIL VENTOLIN) 2.5 mg /3 mL (0.083 %) nebulizer solution by Nebulization route once. Yes Provider, Historical   carvedilol (COREG) 6.25 mg tablet Take  by mouth two (2) times daily (with meals). Yes Provider, Historical   cloNIDine HCl (CATAPRES) 0.1 mg tablet Take  by mouth two (2) times a day. Yes Provider, Historical   docusate sodium (COLACE) 100 mg capsule Take 100 mg by mouth two (2) times a day. Yes Provider, Historical   esomeprazole (NEXIUM) 40 mg capsule Take  by mouth daily. Yes Provider, Historical   ferrous sulfate 325 mg (65 mg iron) tablet Take  by mouth Daily (before breakfast). Yes Provider, Historical   furosemide (LASIX) 80 mg tablet Take  by mouth daily. Yes Provider, Historical   gabapentin (NEURONTIN) 100 mg capsule Take  by mouth three (3) times daily. Yes Provider, Historical   hydrALAZINE (APRESOLINE) 100 mg tablet Take  by mouth two (2) times a day. Yes Provider, Historical   INSULIN LISPRO SC 10 Units by SubCUTAneous route three (3) times daily. Yes Provider, Historical   mirtazapine (REMERON) 15 mg tablet Take  by mouth nightly. Yes Provider, Historical   rosuvastatin (CRESTOR) 10 mg tablet Take 10 mg by mouth nightly. Yes Provider, Historical   sodium chloride-aloe vera (AYR) topical gel Apply  to affected area once. Yes Provider, Historical   insulin glargine (LANTUS) 100 unit/mL injection 12 Units by SubCUTAneous route nightly. Yes Provider, Historical   traMADol (ULTRAM) 50 mg tablet Take 50 mg by mouth every six (6) hours as needed for Pain. Provider, Historical   bisacodyl (DULCOLAX) 10 mg suppository Insert 10 mg into rectum daily. Provider, Historical   guaiFENesin (ROBITUSSIN) 100 mg/5 mL liquid Take 200 mg by mouth three (3) times daily as needed for Cough. Provider, Historical   alteplase (CATHFLO) 2 mg injection by InterCATHeter route once.     Provider, Historical   aspirin delayed-release 81 mg tablet Take  by mouth daily. Provider, Historical   clopidogrel (PLAVIX) 75 mg tab Take  by mouth. Provider, Historical   ipratropium (ATROVENT HFA) 17 mcg/actuation inhaler Take 1 Puff by inhalation. Provider, Historical   nystatin (MYCOSTATIN) powder Apply  to affected area four (4) times daily. Provider, Historical       REVIEW OF SYSTEMS:       Total of 12 systems reviewed as follows:       POSITIVE= underlined text  Negative = text not underlined  General:  fever, chills, sweats, generalized weakness, weight loss/gain,      loss of appetite   Eyes:    blurred vision, eye pain, loss of vision, double vision  ENT:    rhinorrhea, pharyngitis   Respiratory:   cough, sputum production, SOB, LARSEN, wheezing, pleuritic pain   Cardiology:   chest pain, palpitations, orthopnea, PND, edema, syncope   Gastrointestinal:  abdominal pain , N/V, diarrhea, dysphagia, constipation, bleeding   Genitourinary:  frequency, urgency, dysuria, hematuria, incontinence   Muskuloskeletal :  arthralgia, myalgia, back pain  Hematology:  easy bruising, nose or gum bleeding, lymphadenopathy   Dermatological: rash, ulceration, pruritis, color change / jaundice  Endocrine:   hot flashes or polydipsia   Neurological:  headache, dizziness, confusion, focal weakness, paresthesia,     Speech difficulties, memory loss, gait difficulty  Psychological: Feelings of anxiety, depression, agitation    Objective:   VITALS:    Visit Vitals  /77 (BP 1 Location: Right arm, BP Patient Position: At rest)   Pulse 87   Temp 98.9 °F (37.2 °C)   Resp 20   Ht 5' 6\" (1.676 m)   Wt 142.8 kg (314 lb 13.1 oz)   SpO2 97%   BMI 50.81 kg/m²       PHYSICAL EXAM:    General:    Alert, cooperative, no distress, appears stated age.      HEENT: Atraumatic, anicteric sclerae, pink conjunctivae     No oral ulcers, mucosa moist, throat clear, dentition fair  Neck:  Supple, symmetrical,  thyroid: non tender  Lungs:   Clear to auscultation bilaterally. No Wheezing or Rhonchi. No rales. Chest wall:  No tenderness  No Accessory muscle use. Heart:   Regular  rhythm,  No  murmur   No edema  Abdomen:   Soft, non-tender. Not distended. Bowel sounds normal  Extremities: No cyanosis. No clubbing, Skin turgor normal, Capillary refill normal, Radial dial pulse 2+  Skin:     Not pale. Not Jaundiced  No rashes   Psych:  Good insight. Not depressed. Not anxious or agitated. Neurologic: EOMs intact. No facial asymmetry. No aphasia or slurred speech. Symmetrical strength, Sensation grossly intact. Alert and oriented X 4.     _______________________________________________________________________  Care Plan discussed with:    Comments   Patient x    Family  x  Daughter   RN     Care Manager                    Consultant:      _______________________________________________________________________  Expected  Disposition:   Home with Family x   HH/PT/OT/RN    SNF/LTC    CHRISTA    ________________________________________________________________________  TOTAL TIME: 48   Minutes    Critical Care Provided     Minutes non procedure based      Comments    x Reviewed previous records   >50% of visit spent in counseling and coordination of care  Discussion with patient and/or family and questions answered       Given the patient's current clinical presentation, I have a high level of concern for decompensation if discharged from the ED. Complex decision making was performed which includes reviewing the patient's available past medical records, laboratory results, and Xray films. I have also directly communicated my plan and discussed this case with the involved ED physician.     ____________________________________________________________________  Liu Arndt MD    Procedures: see electronic medical records for all procedures/Xrays and details which were not copied into this note but were reviewed prior to creation of Plan.     LAB DATA REVIEWED:    Recent Results (from the past 24 hour(s))   GLUCOSE, POC    Collection Time: 12/07/18  8:30 AM   Result Value Ref Range    Glucose (POC) 518 (H) 65 - 100 mg/dL    Performed by Dominik Riojas    GLUCOSE, POC    Collection Time: 12/07/18  9:34 AM   Result Value Ref Range    Glucose (POC) 453 (H) 65 - 100 mg/dL    Performed by [address] [place] [address],[address], POC    Collection Time: 12/07/18 10:02 AM   Result Value Ref Range    Glucose (POC) 447 (H) 65 - 100 mg/dL    Performed by [address] [place] [address],[address], POC    Collection Time: 12/07/18 11:12 AM   Result Value Ref Range    Glucose (POC) 340 (H) 65 - 100 mg/dL    Performed by Lonnie Cheng

## 2018-12-07 NOTE — PROGRESS NOTES
Preoperative glucose 518, 10 units regular insulin given. Glucose rechecked one hour later, now 447. An additional 10 units regular insulin given iv. Case canceled. Patient will be admitted for management of hyperglycemia.

## 2018-12-07 NOTE — PERIOP NOTES
TRANSFER - OUT REPORT:    Verbal report given to RADHIKA RN(name) on Dominguez Sun  being transferred to [address] [place] [address] for routine progression of care       Report consisted of patients Situation, Background, Assessment and   Recommendations(SBAR). Information from the following report(s) SBAR, Kardex, Intake/Output, MAR and Recent Results was reviewed with the receiving nurse. Lines:   Peripheral IV 12/07/18 Right Wrist (Active)   Site Assessment Clean, dry, & intact 12/7/2018  9:11 AM   Phlebitis Assessment 0 12/7/2018  9:11 AM   Infiltration Assessment 0 12/7/2018  9:11 AM   Dressing Status Clean, dry, & intact 12/7/2018  9:11 AM   Dressing Type Tape;Transparent 12/7/2018  9:11 AM   Hub Color/Line Status Blue; Infusing 12/7/2018  9:11 AM        Opportunity for questions and clarification was provided.       Patient transported with:   Monitor  O2 @ 2 liters  Tech

## 2018-12-08 LAB
COMMENT, HOLDF: NORMAL
EST. AVERAGE GLUCOSE BLD GHB EST-MCNC: 309 MG/DL
GLUCOSE BLD STRIP.AUTO-MCNC: 288 MG/DL (ref 65–100)
GLUCOSE BLD STRIP.AUTO-MCNC: 302 MG/DL (ref 65–100)
GLUCOSE BLD STRIP.AUTO-MCNC: 380 MG/DL (ref 65–100)
GLUCOSE BLD STRIP.AUTO-MCNC: 388 MG/DL (ref 65–100)
GLUCOSE BLD STRIP.AUTO-MCNC: 392 MG/DL (ref 65–100)
HAV IGM SER QL: NONREACTIVE
HBA1C MFR BLD: 12.4 % (ref 4.2–6.3)
HBV CORE IGM SER QL: NONREACTIVE
HBV SURFACE AG SER QL: 0.6 INDEX
HBV SURFACE AG SER QL: NEGATIVE
HCV AB SERPL QL IA: NONREACTIVE
HCV COMMENT,HCGAC: NORMAL
SAMPLES BEING HELD,HOLD: NORMAL
SERVICE CMNT-IMP: ABNORMAL
SP1: NORMAL
SP2: NORMAL
SP3: NORMAL

## 2018-12-08 PROCEDURE — 99218 HC RM OBSERVATION: CPT

## 2018-12-08 PROCEDURE — 96372 THER/PROPH/DIAG INJ SC/IM: CPT

## 2018-12-08 PROCEDURE — 94760 N-INVAS EAR/PLS OXIMETRY 1: CPT

## 2018-12-08 PROCEDURE — G0378 HOSPITAL OBSERVATION PER HR: HCPCS

## 2018-12-08 PROCEDURE — 82962 GLUCOSE BLOOD TEST: CPT

## 2018-12-08 PROCEDURE — 83036 HEMOGLOBIN GLYCOSYLATED A1C: CPT

## 2018-12-08 PROCEDURE — 36415 COLL VENOUS BLD VENIPUNCTURE: CPT

## 2018-12-08 PROCEDURE — 74011250636 HC RX REV CODE- 250/636: Performed by: INTERNAL MEDICINE

## 2018-12-08 PROCEDURE — 74011000250 HC RX REV CODE- 250: Performed by: INTERNAL MEDICINE

## 2018-12-08 PROCEDURE — 90935 HEMODIALYSIS ONE EVALUATION: CPT

## 2018-12-08 PROCEDURE — 74011636637 HC RX REV CODE- 636/637: Performed by: INTERNAL MEDICINE

## 2018-12-08 PROCEDURE — 74011250637 HC RX REV CODE- 250/637: Performed by: INTERNAL MEDICINE

## 2018-12-08 PROCEDURE — 80074 ACUTE HEPATITIS PANEL: CPT

## 2018-12-08 RX ORDER — INSULIN LISPRO 100 [IU]/ML
10 INJECTION, SOLUTION INTRAVENOUS; SUBCUTANEOUS
Status: DISCONTINUED | OUTPATIENT
Start: 2018-12-08 | End: 2018-12-12 | Stop reason: HOSPADM

## 2018-12-08 RX ORDER — POLYVINYL ALCOHOL 14 MG/ML
1 SOLUTION/ DROPS OPHTHALMIC 2 TIMES DAILY
Status: DISCONTINUED | OUTPATIENT
Start: 2018-12-08 | End: 2018-12-12 | Stop reason: HOSPADM

## 2018-12-08 RX ADMIN — ACETAMINOPHEN 650 MG: 325 TABLET ORAL at 20:03

## 2018-12-08 RX ADMIN — ASPIRIN 81 MG: 81 TABLET, COATED ORAL at 15:05

## 2018-12-08 RX ADMIN — INSULIN LISPRO 6 UNITS: 100 INJECTION, SOLUTION INTRAVENOUS; SUBCUTANEOUS at 07:13

## 2018-12-08 RX ADMIN — INSULIN LISPRO 2 UNITS: 100 INJECTION, SOLUTION INTRAVENOUS; SUBCUTANEOUS at 21:09

## 2018-12-08 RX ADMIN — ACETAMINOPHEN 650 MG: 325 TABLET ORAL at 02:48

## 2018-12-08 RX ADMIN — INSULIN GLARGINE 12 UNITS: 100 INJECTION, SOLUTION SUBCUTANEOUS at 21:09

## 2018-12-08 RX ADMIN — POLYVINYL ALCOHOL 1 DROP: 14 SOLUTION/ DROPS OPHTHALMIC at 21:07

## 2018-12-08 RX ADMIN — INSULIN LISPRO 10 UNITS: 100 INJECTION, SOLUTION INTRAVENOUS; SUBCUTANEOUS at 17:38

## 2018-12-08 RX ADMIN — Medication 10 ML: at 15:06

## 2018-12-08 RX ADMIN — INSULIN LISPRO 6 UNITS: 100 INJECTION, SOLUTION INTRAVENOUS; SUBCUTANEOUS at 17:38

## 2018-12-08 RX ADMIN — CLOPIDOGREL BISULFATE 75 MG: 75 TABLET ORAL at 15:04

## 2018-12-08 RX ADMIN — INSULIN LISPRO 4 UNITS: 100 INJECTION, SOLUTION INTRAVENOUS; SUBCUTANEOUS at 15:04

## 2018-12-08 RX ADMIN — Medication 10 ML: at 05:44

## 2018-12-08 RX ADMIN — GABAPENTIN 100 MG: 100 CAPSULE ORAL at 15:05

## 2018-12-08 RX ADMIN — CARVEDILOL 6.25 MG: 6.25 TABLET, FILM COATED ORAL at 15:05

## 2018-12-08 RX ADMIN — ACETAMINOPHEN 650 MG: 325 TABLET ORAL at 08:21

## 2018-12-08 RX ADMIN — INSULIN LISPRO 10 UNITS: 100 INJECTION, SOLUTION INTRAVENOUS; SUBCUTANEOUS at 15:03

## 2018-12-08 RX ADMIN — HYDRALAZINE HYDROCHLORIDE 50 MG: 50 TABLET, FILM COATED ORAL at 15:05

## 2018-12-08 RX ADMIN — HEPARIN SODIUM 7500 UNITS: 5000 INJECTION INTRAVENOUS; SUBCUTANEOUS at 21:08

## 2018-12-08 RX ADMIN — GABAPENTIN 100 MG: 100 CAPSULE ORAL at 21:10

## 2018-12-08 RX ADMIN — Medication 10 ML: at 21:10

## 2018-12-08 RX ADMIN — CLONIDINE HYDROCHLORIDE 0.1 MG: 0.1 TABLET ORAL at 15:05

## 2018-12-08 NOTE — PROGRESS NOTES
Bedside and Verbal shift change report given to [address] [place] [address] (oncoming nurse) by Ishmael Arango RN (offgoing nurse). Report included the following information SBAR, Kardex, Intake/Output, MAR and Recent Results.

## 2018-12-08 NOTE — PROGRESS NOTES
Hospitalist Progress Note    NAME: Margareth Anderson   :  1950   MRN:  [id number]       Assessment / Plan:  DM2, uncontrolled with hyperglycemia  - on admission. Improved after regular insulin 10 units x 2 doses. -cont Lantus  -resumed scheduled humalog today .  -A1c 12.4    Fever  -reported during dialysis. Currently afebrile and denies significant cough, vomiting, abd pain. CXR neg.     HTN  -cont antihypertensive meds; CHF ruled out. No pulm edema on CXR. JAZMYN  -on CPAP and prn oxygen at home    ESRD on TTS  -consult Nephrology for HD  Her surgery (left AV graft) has been cancelled and cannot be rescheduled for Monday.           Body mass index is 50.81 kg/m². Code status: Full  Prophylaxis: Hep SQ  Recommended Disposition: Home w/Family     Subjective:     Chief Complaint / Reason for Physician Visit  Follow up for fever. Discussed with RN events overnight. No cp/sob/abd pain    Review of Systems:  Symptom Y/N Comments  Symptom Y/N Comments   Fever/Chills    Chest Pain     Poor Appetite    Edema     Cough    Abdominal Pain     Sputum    Joint Pain     SOB/LARSEN    Pruritis/Rash     Nausea/vomit    Tolerating PT/OT     Diarrhea    Tolerating Diet     Constipation    Other       Could NOT obtain due to:      Objective:     VITALS:   Last 24hrs VS reviewed since prior progress note.  Most recent are:  Patient Vitals for the past 24 hrs:   Temp Pulse Resp BP SpO2   18 1414 98.4 °F (36.9 °C) 98 18 134/67 100 %   18 1245 -- 94 16 142/72 --   18 1230 -- 94 16 147/77 --   18 1215 -- 91 16 140/79 --   18 1200 -- 90 16 122/65 --   18 1145 -- 87 16 153/80 --   18 1130 -- 86 16 133/74 --   18 1115 -- 85 16 127/73 --   18 1100 -- 85 16 142/74 --   18 1045 -- 83 16 138/75 --   18 1030 -- 82 18 149/73 --   18 1015 -- 79 18 135/65 --   18 1000 -- 82 18 116/65 --   18 0945 -- 83 18 124/64 --   18 0930 -- 79 18 118/56 --   12/08/18 0915 -- 80 18 136/76 --   12/08/18 0900 -- 73 18 147/72 --   12/08/18 0849 98.4 °F (36.9 °C) 79 18 144/69 --   12/08/18 0730 97.6 °F (36.4 °C) 75 18 120/61 100 %   12/08/18 0655 97.9 °F (36.6 °C) 73 18 132/52 100 %   12/08/18 0225 97.7 °F (36.5 °C) 70 18 132/55 100 %   12/07/18 2303 98.2 °F (36.8 °C) 71 18 118/48 98 %   12/07/18 1905 98 °F (36.7 °C) 77 18 104/42 100 %   12/07/18 1725 -- 79 -- 137/76 --   12/07/18 1538 98.5 °F (36.9 °C) 88 18 98/44 99 %       Intake/Output Summary (Last 24 hours) at 12/8/2018 1424  Last data filed at 12/7/2018 2303  Gross per 24 hour   Intake 360 ml   Output --   Net 360 ml        PHYSICAL EXAM:  General: WD, WN. Alert, cooperative, no acute distress    EENT:  EOMI. Anicteric sclerae. MMM  Resp:  CTA bilaterally, no wheezing or rales. No accessory muscle use  CV:  Regular  rhythm,  No edema  GI:  Soft, Non distended, Non tender.  +Bowel sounds  Neurologic:  Alert and oriented X 3, normal speech,   Psych:   Good insight. Not anxious nor agitated  Skin:  No rashes. No jaundice    Reviewed most current lab test results and cultures  YES  Reviewed most current radiology test results   YES  Review and summation of old records today    NO  Reviewed patient's current orders and MAR    YES  PMH/SH reviewed - no change compared to H&P  ________________________________________________________________________  Care Plan discussed with:    Comments   Patient x    Family      RN x    Care Manager     Consultant                        Multidiciplinary team rounds were held today with , nursing, pharmacist and clinical coordinator. Patient's plan of care was discussed; medications were reviewed and discharge planning was addressed.      ________________________________________________________________________  Total NON critical care TIME:  25   Minutes    Total CRITICAL CARE TIME Spent:   Minutes non procedure based      Comments   >50% of visit spent in counseling and coordination of care     ________________________________________________________________________  Lynda Frias MD     Procedures: see electronic medical records for all procedures/Xrays and details which were not copied into this note but were reviewed prior to creation of Plan. LABS:  I reviewed today's most current labs and imaging studies.   Pertinent labs include:  Recent Labs     12/07/18  1728   WBC 6.3   HGB 10.3*   HCT 32.7*        Recent Labs     12/07/18  1728   *   K 4.4   CL 93*   CO2 31   *   BUN 40*   CREA 4.50*   CA 8.2*   PHOS 4.3   ALB 3.2*       Signed: Lynda Frias MD

## 2018-12-08 NOTE — PROGRESS NOTES
HD TRANSFER - OUT REPORT:    Verbal report given to Yolanda Levine RN on [facility] being transferred to medical telemetry room 21 . Report consisted of patient's Situation, Background, Assessment and   Recommendations(SBAR). Information from the following report(s) hemodialysis treatment note was reviewed with the receiving nurse. Method:  $$ Method: Hemodialysis (12/08/18 0849)    Fluid Removed  NET Fluid Removed (mL): 2500 ml (12/08/18 1322)     Patient response to treatment: well    End Time  Hemodialysis End Time: 6550 (12/08/18 1322)  If not documented, dialysis nurse to update post-dialysis row in HD/Filtration flowsheet     Medications /Volume expansion agents or Fluid boluses administered during treatment? no    Post-dialysis medication administration due?  no  Remind nurse to administer post-HD medication upon return to unit. Fistula hemostasis? n/a    Line heparinization? no    R permcath dressing changed 12/08/18    Opportunity for questions and clarification was provided. Patient transported with: belongings and 2L of O2 by HD staff, returned to room in stable condition.

## 2018-12-08 NOTE — PROGRESS NOTES
Telephone orders received to restart patient home insulin regimen, Humalog 10 units before breakfast, lunch, and dinner per Dr. Geri Oreilly.

## 2018-12-08 NOTE — PROGRESS NOTES
Bedside and Verbal shift change report given to Hakeem Arnold (oncoming nurse) by Hoda Vickers RN (offgoing nurse). Report included the following information SBAR, Kardex, Intake/Output, MAR, Accordion, Recent Results and Med Rec Status.

## 2018-12-08 NOTE — DIALYSIS
[facility] Dialysis Team [facility]  [phone]    Vitals   Pre   Post   Assessment   Pre   Post     Temp  Temp: 98.4 °F (36.9 °C) (12/08/18 0849) 99.3 oral LOC  A&OX4 A&OX4   HR   Pulse (Heart Rate): 79 (12/08/18 0849) 98 Lungs   2L of O2 continous, Coarse, expiratory wheezing 2L of O2 continous, Coarse, expiratory wheezing   B/P   BP: 144/69 (12/08/18 0849) 150/68 Cardiac   Bedside telemetry, NSR per primary RN, HRR, S1 S2 present HRR, S1 S2 present   Resp   Resp Rate: 18 (12/08/18 0849) 16 Skin   Warm and dry, R toe amputation Warm and dry, R toe amputation    Pain level  Pain Intensity 1: 7 (12/08/18 0822) 0 Edema  Trace BLE     Trace BLE   Orders:    Duration:   Start:    08:49 End:    13:22 Total:   4.5 hrs   Dialyzer:   Dialyzer/Set Up Inspection: Yg Mckee (12/08/18 0849)   Glenny Smith Bath:   Dialysate K (mEq/L): 2 (12/08/18 0849)   Ca Bath:   Dialysate CA (mEq/L): 2.5 (12/08/18 0849)   Na/Bicarb:   Dialysate NA (mEq/L): 140 (12/08/18 0849)   Target Fluid Removal:   Goal/Amount of Fluid to Remove (mL): 2500 mL (12/08/18 0849)   Access     Type & Location:   RIJ CVC: Dressing CDI. No s/s of infection. Both lumens aspirate & flush well. Running well at .    Labs     Obtained/Reviewed   Critical Results Called   Date when labs were drawn-  Hgb-    HGB   Date Value Ref Range Status   12/07/2018 10.3 (L) 11.5 - 16.0 g/dL Final     K-    Potassium   Date Value Ref Range Status   12/07/2018 4.4 3.5 - 5.1 mmol/L Final     Ca-   Calcium   Date Value Ref Range Status   12/07/2018 8.2 (L) 8.5 - 10.1 MG/DL Final     Bun-   BUN   Date Value Ref Range Status   12/07/2018 40 (H) 6 - 20 MG/DL Final     Creat-   Creatinine   Date Value Ref Range Status   12/07/2018 4.50 (H) 0.55 - 1.02 MG/DL Final        Medications/ Blood Products Given     Name   Dose   Route and Time     none                Blood Volume Processed (BVP):   99.2 L Net Fluid   Removed:  2500 ml   Comments   Time Out Done: 08:45  Primary Nurse Rpt Pre: Vick Herron RN  Primary Nurse Rpt Post: Helga Garcia RN  Pt Education: procedural, CVC care  Care Plan: possible d/c after HD today  Tx Summary:  Pt arrived to HD suite A&Ox4. Consent signed & on file. SBAR received from Primary RN. 08:49- Both lumens of permcath disinfected with Alcohol per policy. Each lumen aspirated for blood return and flushed with Normal Saline per policy. Labs drawn per request/ order. VSS. Dialysis Tx initiated. 09:00- Pt resting quietly. VSS  10:00- Pt resting quietly. VSS  10:22- Assisted patient to reposition for comfort. 11:00- Pt resting quietly. VSS  12:00- Pt resting quietly. VSS  12:47- Assisted patient to reposition for comfort  13:00- Sterile dressing change performed on permcath per policy. 13:22- Tx ended. Returned all possible blood to patient. VSS. Central line catheter flushed with normal saline per policy. Ports disinfected with Alcohol per policy and lines disconnected and capped using aseptic technique. SBAR given to Primary, RN Helga Garcia. Patient returned to unit in stable condition. All dialysis related medications have been reviewed. Admiting Diagnosis: ESRD, hyperglycemia  Pt's previous clinic- [place]   Consent signed - Informed Consent Verified: Yes (12/08/18 0849)  [facility] Consent - on file  Hepatitis Status- HbAg negative, 12/08/18  Machine #- Machine Number: [id number] (12/08/18 8832)  Telemetry status- NSR per primary RN  Pre-dialysis wt. -

## 2018-12-08 NOTE — PROGRESS NOTES
Bedside and Verbal shift change report given to [facility] (oncoming nurse) by Ishmael Arango RN (offgoing nurse). Report included the following information SBAR, Kardex, Intake/Output, MAR and Recent Results.

## 2018-12-08 NOTE — PROGRESS NOTES
TRANSFER - IN REPORT:    Verbal report received from [address] on Liam Olivares  being received from [address] [place] [address] room 91 for hemodialysis treatment. Report consisted of patients Situation, Background, Assessment and   Recommendations(SBAR). Information from the following report(s) kardex was reviewed with the receiving nurse. Opportunity for questions and clarification was provided. Assessment completed upon patients arrival to unit and care assumed.

## 2018-12-09 LAB
GLUCOSE BLD STRIP.AUTO-MCNC: 210 MG/DL (ref 65–100)
GLUCOSE BLD STRIP.AUTO-MCNC: 222 MG/DL (ref 65–100)
GLUCOSE BLD STRIP.AUTO-MCNC: 324 MG/DL (ref 65–100)
GLUCOSE BLD STRIP.AUTO-MCNC: 387 MG/DL (ref 65–100)
SERVICE CMNT-IMP: ABNORMAL

## 2018-12-09 PROCEDURE — 74011250637 HC RX REV CODE- 250/637: Performed by: INTERNAL MEDICINE

## 2018-12-09 PROCEDURE — 74011250636 HC RX REV CODE- 250/636: Performed by: INTERNAL MEDICINE

## 2018-12-09 PROCEDURE — 82962 GLUCOSE BLOOD TEST: CPT

## 2018-12-09 PROCEDURE — 74011636637 HC RX REV CODE- 636/637: Performed by: INTERNAL MEDICINE

## 2018-12-09 PROCEDURE — G0378 HOSPITAL OBSERVATION PER HR: HCPCS

## 2018-12-09 PROCEDURE — 99218 HC RM OBSERVATION: CPT

## 2018-12-09 RX ORDER — HYDROCODONE BITARTRATE AND ACETAMINOPHEN 5; 325 MG/1; MG/1
1 TABLET ORAL
Qty: 12 TAB | Refills: 0 | Status: ON HOLD | OUTPATIENT
Start: 2018-12-09 | End: 2019-03-09 | Stop reason: SDUPTHER

## 2018-12-09 RX ADMIN — INSULIN LISPRO 10 UNITS: 100 INJECTION, SOLUTION INTRAVENOUS; SUBCUTANEOUS at 17:13

## 2018-12-09 RX ADMIN — POLYVINYL ALCOHOL 1 DROP: 14 SOLUTION/ DROPS OPHTHALMIC at 08:46

## 2018-12-09 RX ADMIN — GABAPENTIN 100 MG: 100 CAPSULE ORAL at 08:47

## 2018-12-09 RX ADMIN — Medication 10 ML: at 22:08

## 2018-12-09 RX ADMIN — CARVEDILOL 6.25 MG: 6.25 TABLET, FILM COATED ORAL at 17:14

## 2018-12-09 RX ADMIN — HYDRALAZINE HYDROCHLORIDE 50 MG: 50 TABLET, FILM COATED ORAL at 08:47

## 2018-12-09 RX ADMIN — POLYVINYL ALCOHOL 1 DROP: 14 SOLUTION/ DROPS OPHTHALMIC at 22:07

## 2018-12-09 RX ADMIN — CLONIDINE HYDROCHLORIDE 0.1 MG: 0.1 TABLET ORAL at 17:14

## 2018-12-09 RX ADMIN — CARVEDILOL 6.25 MG: 6.25 TABLET, FILM COATED ORAL at 08:47

## 2018-12-09 RX ADMIN — INSULIN GLARGINE 12 UNITS: 100 INJECTION, SOLUTION SUBCUTANEOUS at 22:06

## 2018-12-09 RX ADMIN — CLOPIDOGREL BISULFATE 75 MG: 75 TABLET ORAL at 08:47

## 2018-12-09 RX ADMIN — ACETAMINOPHEN 650 MG: 325 TABLET ORAL at 05:34

## 2018-12-09 RX ADMIN — Medication 10 ML: at 05:35

## 2018-12-09 RX ADMIN — HYDRALAZINE HYDROCHLORIDE 50 MG: 50 TABLET, FILM COATED ORAL at 17:14

## 2018-12-09 RX ADMIN — INSULIN LISPRO 10 UNITS: 100 INJECTION, SOLUTION INTRAVENOUS; SUBCUTANEOUS at 11:39

## 2018-12-09 RX ADMIN — GABAPENTIN 100 MG: 100 CAPSULE ORAL at 16:05

## 2018-12-09 RX ADMIN — INSULIN LISPRO 1 UNITS: 100 INJECTION, SOLUTION INTRAVENOUS; SUBCUTANEOUS at 22:05

## 2018-12-09 RX ADMIN — ACETAMINOPHEN 650 MG: 325 TABLET ORAL at 16:05

## 2018-12-09 RX ADMIN — Medication 10 ML: at 17:14

## 2018-12-09 RX ADMIN — GABAPENTIN 100 MG: 100 CAPSULE ORAL at 22:03

## 2018-12-09 RX ADMIN — INSULIN LISPRO 2 UNITS: 100 INJECTION, SOLUTION INTRAVENOUS; SUBCUTANEOUS at 17:13

## 2018-12-09 RX ADMIN — CLONIDINE HYDROCHLORIDE 0.1 MG: 0.1 TABLET ORAL at 08:47

## 2018-12-09 RX ADMIN — INSULIN LISPRO 4 UNITS: 100 INJECTION, SOLUTION INTRAVENOUS; SUBCUTANEOUS at 11:39

## 2018-12-09 RX ADMIN — INSULIN LISPRO 6 UNITS: 100 INJECTION, SOLUTION INTRAVENOUS; SUBCUTANEOUS at 08:48

## 2018-12-09 RX ADMIN — INSULIN LISPRO 10 UNITS: 100 INJECTION, SOLUTION INTRAVENOUS; SUBCUTANEOUS at 08:49

## 2018-12-09 RX ADMIN — ACETAMINOPHEN 650 MG: 325 TABLET ORAL at 22:02

## 2018-12-09 RX ADMIN — ASPIRIN 81 MG: 81 TABLET, COATED ORAL at 08:48

## 2018-12-09 NOTE — PROGRESS NOTES
Bedside and Verbal shift change report given to Hakeem Arnold (oncoming nurse) by Quinn Knott RN (offgoing nurse). Report included the following information SBAR, Kardex, Intake/Output, MAR, Accordion, Recent Results and Med Rec Status.

## 2018-12-09 NOTE — PROGRESS NOTES
9:28AM    Reason for Admission:   ESRD                  RRAT Score:    16              Do you (patient/family) have any concerns for transition/discharge? Transportation home               Plan for utilizing home health:     None     Likelihood of readmission? High            Transition of Care Plan:      Home with family     CM met with pt and her dtr, Valentino Forget, to complete assessment and discuss d/c planning. Pt is a 69 yo female admitted for ESRD. Pt is dependent for most ADLs at home. Pt stays in her bed a lot and requires family assistance to transfer to her . Pt's dtr provided CM information for [employer]. CM PC to [facility]. They do not transport on Sundays. PC directly to pt's insurance transportation line. CM told that pt does not have stretcher benefits through them, only WC and ambulatory. CM discussed with pt who is adamant that she only uses stretcher transport. She relies on [facility] to get her to HD and to appts. Pt stating that she cannot use WC van to get home. State and Medicare observation notices, provided, signed, and placed on chart. 10:52AM  CM PC to [facility]. Could provide transportation but not Medicaid transport and cannot guarantee payment. CM discussion with MD. Plan for pt to stay tonight and d/c home tomorrow. PC to [facility] to arrange transport for tomorrow. Per dispatcher, CM must contact [facility] to get authorization. 11:03AM  PC to [facility] ([phone]). Per gwen, they do not provide authorization for next day transports on the w/e. Weekday CM will need to contact them to request authorization be sent to [employer]. CM will continue to follow and assist with d/c planning.      Care Management Interventions  PCP Verified by CM: Yes(Dr. Diego Phillips)  Mode of Transport at Discharge: BLS  Transition of Care Consult (CM Consult): Discharge Planning  Discharge Durable Medical Equipment: No  Physical Therapy Consult: No  Occupational Therapy Consult: No  Speech Therapy Consult: No  Current Support Network: Own Home  Confirm Follow Up Transport: Other (see comment)(Medicaid Transport )  Plan discussed with Pt/Family/Caregiver: Yes  Discharge Location  Discharge Placement: Home with family assistance      BETO Pearl  Care Manager

## 2018-12-09 NOTE — PROGRESS NOTES
Hospitalist Progress Note    NAME: Richard Avery   :  1950   MRN:  [id number]       Assessment / Plan:  DM2, uncontrolled with hyperglycemia  - on admission. Improved after regular insulin 10 units x 2 doses. -cont Lantus  -resumed scheduled humalog   -A1c 12.4    Fever  -reported during dialysis. Currently afebrile and denies significant cough, vomiting, abd pain. CXR neg.     HTN  -cont antihypertensive meds; CHF ruled out. No pulm edema on CXR. JAZMYN  -on CPAP and prn oxygen at home    ESRD on TTS  -consult Nephrology for HD  Her surgery (left AV graft) has been cancelled and cannot be rescheduled for Monday.         Plan for dc home tomorrow. CM reported that patient's transportation service not available today and insurance does not cover any other company. Body mass index is 47.24 kg/m². Code status: Full  Prophylaxis: Hep SQ  Recommended Disposition: Home w/Family     Subjective:     Chief Complaint / Reason for Physician Visit  Follow up for fever. Discussed with RN events overnight. No cp/sob/abd pain    Review of Systems:  Symptom Y/N Comments  Symptom Y/N Comments   Fever/Chills    Chest Pain     Poor Appetite    Edema     Cough    Abdominal Pain     Sputum    Joint Pain     SOB/LARSEN    Pruritis/Rash     Nausea/vomit    Tolerating PT/OT     Diarrhea    Tolerating Diet     Constipation    Other       Could NOT obtain due to:      Objective:     VITALS:   Last 24hrs VS reviewed since prior progress note.  Most recent are:  Patient Vitals for the past 24 hrs:   Temp Pulse Resp BP SpO2   18 1023 98.4 °F (36.9 °C) 91 16 111/54 97 %   18 0731 98.1 °F (36.7 °C) 80 16 120/44 99 %   18 0315 98.8 °F (37.1 °C) 81 16 122/61 91 %   18 2317 98.6 °F (37 °C) 82 17 146/63 97 %   18 1910 97.5 °F (36.4 °C) 88 17 127/62 96 %   18 1414 98.4 °F (36.9 °C) 98 18 134/67 100 %   18 1322 99.3 °F (37.4 °C) 98 16 150/68 --   18 1315 -- (!) 008 16 130/75 --   12/08/18 1300 -- 99 16 130/75 --   12/08/18 1245 -- 94 16 142/72 --   12/08/18 1230 -- 94 16 147/77 --   12/08/18 1215 -- 91 16 140/79 --   12/08/18 1200 -- 90 16 122/65 --   12/08/18 1145 -- 87 16 153/80 --   12/08/18 1130 -- 86 16 133/74 --       Intake/Output Summary (Last 24 hours) at 12/9/2018 1117  Last data filed at 12/9/2018 0315  Gross per 24 hour   Intake 840 ml   Output 2500 ml   Net -1660 ml        PHYSICAL EXAM:  General: WD, WN. Alert, cooperative, no acute distress    EENT:  EOMI. Anicteric sclerae. MMM  Resp:  CTA bilaterally, no wheezing or rales. No accessory muscle use  CV:  Regular  rhythm,  No edema  GI:  Soft, Non distended, Non tender.  +Bowel sounds  Neurologic:  Alert and oriented X 3, normal speech,   Psych:   Good insight. Not anxious nor agitated  Skin:  No rashes. No jaundice    Reviewed most current lab test results and cultures  YES  Reviewed most current radiology test results   YES  Review and summation of old records today    NO  Reviewed patient's current orders and MAR    YES  PMH/ reviewed - no change compared to H&P  ________________________________________________________________________  Care Plan discussed with:    Comments   Patient x    Family  x    RN x    Care Manager     Consultant                        Multidiciplinary team rounds were held today with , nursing, pharmacist and clinical coordinator. Patient's plan of care was discussed; medications were reviewed and discharge planning was addressed.      ________________________________________________________________________  Total NON critical care TIME:  25   Minutes    Total CRITICAL CARE TIME Spent:   Minutes non procedure based      Comments   >50% of visit spent in counseling and coordination of care     ________________________________________________________________________  Jae Cote MD     Procedures: see electronic medical records for all procedures/Xrays and details which were not copied into this note but were reviewed prior to creation of Plan. LABS:  I reviewed today's most current labs and imaging studies.   Pertinent labs include:  Recent Labs     12/07/18  1728   WBC 6.3   HGB 10.3*   HCT 32.7*        Recent Labs     12/07/18  1728   *   K 4.4   CL 93*   CO2 31   *   BUN 40*   CREA 4.50*   CA 8.2*   PHOS 4.3   ALB 3.2*       Signed: Jae Cote MD

## 2018-12-10 LAB
ANION GAP BLD CALC-SCNC: 14 MMOL/L (ref 10–20)
BUN BLD-MCNC: 36 MG/DL (ref 9–20)
CA-I BLD-MCNC: 1.15 MMOL/L (ref 1.12–1.32)
CHLORIDE BLD-SCNC: 93 MMOL/L (ref 98–107)
CO2 BLD-SCNC: 31 MMOL/L (ref 21–32)
CREAT BLD-MCNC: 3.8 MG/DL (ref 0.6–1.3)
GLUCOSE BLD STRIP.AUTO-MCNC: 287 MG/DL (ref 65–100)
GLUCOSE BLD STRIP.AUTO-MCNC: 299 MG/DL (ref 65–100)
GLUCOSE BLD STRIP.AUTO-MCNC: 343 MG/DL (ref 65–100)
GLUCOSE BLD STRIP.AUTO-MCNC: 362 MG/DL (ref 65–100)
GLUCOSE BLD-MCNC: 537 MG/DL (ref 65–100)
HCT VFR BLD CALC: 35 % (ref 35–47)
POTASSIUM BLD-SCNC: 4.5 MMOL/L (ref 3.5–5.1)
SERVICE CMNT-IMP: ABNORMAL
SODIUM BLD-SCNC: 133 MMOL/L (ref 136–145)

## 2018-12-10 PROCEDURE — G0378 HOSPITAL OBSERVATION PER HR: HCPCS

## 2018-12-10 PROCEDURE — 74011636637 HC RX REV CODE- 636/637: Performed by: INTERNAL MEDICINE

## 2018-12-10 PROCEDURE — 94760 N-INVAS EAR/PLS OXIMETRY 1: CPT

## 2018-12-10 PROCEDURE — 96372 THER/PROPH/DIAG INJ SC/IM: CPT

## 2018-12-10 PROCEDURE — 74011250637 HC RX REV CODE- 250/637: Performed by: INTERNAL MEDICINE

## 2018-12-10 PROCEDURE — 74011250636 HC RX REV CODE- 250/636: Performed by: INTERNAL MEDICINE

## 2018-12-10 PROCEDURE — 99218 HC RM OBSERVATION: CPT

## 2018-12-10 PROCEDURE — 82962 GLUCOSE BLOOD TEST: CPT

## 2018-12-10 RX ADMIN — HYDRALAZINE HYDROCHLORIDE 50 MG: 50 TABLET, FILM COATED ORAL at 08:31

## 2018-12-10 RX ADMIN — CLONIDINE HYDROCHLORIDE 0.1 MG: 0.1 TABLET ORAL at 08:30

## 2018-12-10 RX ADMIN — INSULIN LISPRO 4 UNITS: 100 INJECTION, SOLUTION INTRAVENOUS; SUBCUTANEOUS at 17:28

## 2018-12-10 RX ADMIN — CARVEDILOL 6.25 MG: 6.25 TABLET, FILM COATED ORAL at 16:17

## 2018-12-10 RX ADMIN — ASPIRIN 81 MG: 81 TABLET, COATED ORAL at 08:31

## 2018-12-10 RX ADMIN — ACETAMINOPHEN 650 MG: 325 TABLET ORAL at 08:43

## 2018-12-10 RX ADMIN — Medication 10 ML: at 16:16

## 2018-12-10 RX ADMIN — CLOPIDOGREL BISULFATE 75 MG: 75 TABLET ORAL at 08:31

## 2018-12-10 RX ADMIN — INSULIN LISPRO 2 UNITS: 100 INJECTION, SOLUTION INTRAVENOUS; SUBCUTANEOUS at 21:00

## 2018-12-10 RX ADMIN — INSULIN LISPRO 10 UNITS: 100 INJECTION, SOLUTION INTRAVENOUS; SUBCUTANEOUS at 17:27

## 2018-12-10 RX ADMIN — POLYVINYL ALCOHOL 1 DROP: 14 SOLUTION/ DROPS OPHTHALMIC at 21:02

## 2018-12-10 RX ADMIN — GABAPENTIN 100 MG: 100 CAPSULE ORAL at 08:30

## 2018-12-10 RX ADMIN — GABAPENTIN 100 MG: 100 CAPSULE ORAL at 16:17

## 2018-12-10 RX ADMIN — INSULIN LISPRO 10 UNITS: 100 INJECTION, SOLUTION INTRAVENOUS; SUBCUTANEOUS at 08:29

## 2018-12-10 RX ADMIN — INSULIN LISPRO 3 UNITS: 100 INJECTION, SOLUTION INTRAVENOUS; SUBCUTANEOUS at 12:05

## 2018-12-10 RX ADMIN — POLYVINYL ALCOHOL 1 DROP: 14 SOLUTION/ DROPS OPHTHALMIC at 08:32

## 2018-12-10 RX ADMIN — CARVEDILOL 6.25 MG: 6.25 TABLET, FILM COATED ORAL at 08:31

## 2018-12-10 RX ADMIN — GABAPENTIN 100 MG: 100 CAPSULE ORAL at 21:00

## 2018-12-10 RX ADMIN — HYDRALAZINE HYDROCHLORIDE 50 MG: 50 TABLET, FILM COATED ORAL at 17:28

## 2018-12-10 RX ADMIN — CLONIDINE HYDROCHLORIDE 0.1 MG: 0.1 TABLET ORAL at 17:28

## 2018-12-10 RX ADMIN — INSULIN LISPRO 10 UNITS: 100 INJECTION, SOLUTION INTRAVENOUS; SUBCUTANEOUS at 12:04

## 2018-12-10 RX ADMIN — INSULIN GLARGINE 12 UNITS: 100 INJECTION, SOLUTION SUBCUTANEOUS at 21:01

## 2018-12-10 RX ADMIN — INSULIN LISPRO 6 UNITS: 100 INJECTION, SOLUTION INTRAVENOUS; SUBCUTANEOUS at 08:30

## 2018-12-10 RX ADMIN — HEPARIN SODIUM 7500 UNITS: 5000 INJECTION INTRAVENOUS; SUBCUTANEOUS at 21:01

## 2018-12-10 NOTE — PROGRESS NOTES
ALEXANDRA contacted [facility] [phone] to arrange transport for pt home. SW provided info to transport rep. Rep stated she would have to locate a transport in the area due to the inclement weather. [place] [facility]. SW was provided confirmation number of [id number]. SW received a return call stating they were able to locate transport, [facility], and they would be here at 4:00P to transport the pt.     12:45P  SW inquired about the roads near the pt's home. Pt stated her family reported they do not have electricity. SW attempted to contact the home number and it went to . Pt informed she will not be able to discharge until we could confirm she has electricity and transport can get to her home. Pt stated she would keep us updated on the status and roads. Pt will need to be placed on the early HD list in the morning in case we can discharge tomorrow. SW will continue to follow and assist with additional discharge needs.     BETO Petersen  Ext [phone]

## 2018-12-10 NOTE — PHYSICIAN ADVISORY
Letter of Status Determination: Current Status   OBSERVATION is Appropriate         Pt Name:  Farrah Jeffries   MR#  [id number]   [facility]#   [id number]   Arturo Jj  @ [facility]   Hospitalization date  12/7/2018  8:14 AM   Current Attending Physician  Griffin Malik MD   Principal diagnosis  Fever, Hyperglycemia   Clinicals    Farrah Jeffries is a 70 y.o.  female with a history of HTN, DM, JAZMYN and ESRD who I was asked to evaluate for BG >500 and recent fever. Patient is seen at the patient holding area. She had dialysis yesterday and last night she did not administer her usual 12 units of lantus. She was not clear why but sounds like she did give herself some humalog instead. She is scheduled for a left arm AV graft this morning and was found to have a . She was given 10 units IV humulin R and her subsequent . Her surgery was cancelled and we were asked to admit for work up and evaluation of the above problems. Patient also reported having some issue with her BG yesterday and that she had a fever also. Temp today ins 98.9.   Denies vomiting, CP, SOB, abdominal pain or diarrhea, afebrile, BG better controlled,       [place] MCG criteria       STATUS DETERMINATION  On the basis of clinical data, available documentaion, we believe that the current status of this patient as OBSERVATION is Appropriate     The final decision of the patient's hospitalization status depends on the attending physician's judgment    Additional comments     Insurance  Payor: [address] [place] [address] / Plan: [address] [facility] / Product Type: Managed Care Medicare /    Insurance Information                [address] [place] [address] Phone:     Subscriber: Navdeep Gilbert Subscriber#: [id number]    Group#: [id number] Precert#:         CCCP [place]ID/[place] [address] [facility] [place] [facility] Phone:     SubscriberGomez Sarmiento Subscriber#: [id number]    Group#: Precert#:                    Grey Garcia MD  Cell: [phone]  Physician Advisor

## 2018-12-10 NOTE — PROGRESS NOTES
Spoke to Annie from HD. Pt will need to be placed on the early HD list in the morning in case we can discharge tomorrow. Called [facility] office for scheduling .

## 2018-12-10 NOTE — PROGRESS NOTES
Bedside shift change report given to PAMELA Gonzalez (oncoming nurse) by Jana Vicente (offgoing nurse). Report included the following information SBAR, Kardex, Procedure Summary, Intake/Output, MAR and Recent Results.

## 2018-12-10 NOTE — PROGRESS NOTES
Bedside shift change report given to Jim Edouard RN (oncoming nurse) by Jackie Montoya RN (offgoing nurse). Report included the following information SBAR, Kardex, Intake/Output, MAR and Recent Results.

## 2018-12-10 NOTE — PROGRESS NOTES
Hospitalist Progress Note    NAME: Kadie Scott   :  1950   MRN:  [id number]       Assessment / Plan:  DM2, uncontrolled with hyperglycemia  - on admission. Improved after regular insulin 10 units x 2 doses. -cont Lantus  -resumed scheduled humalog   -A1c 12.4    Fever  -reported during dialysis. Currently afebrile and denies significant cough, vomiting, abd pain. CXR neg.     HTN  -cont antihypertensive meds; CHF ruled out. No pulm edema on CXR. JAZMYN  -on CPAP and prn oxygen at home    ESRD on TTS  -consult Nephrology for HD  Her surgery (left AV graft) has been cancelled and cannot be rescheduled for Monday.         Plan:  Dc home tomorrow. Unable to dc on  since patient's transportation service unavailable on . Unable to dc Monday since patient has no power in home and road to house unaccessible. Dc home tomorrow after HD if no complications. Body mass index is 47.24 kg/m². Code status: Full  Prophylaxis: Hep SQ  Recommended Disposition: Home w/Family     Subjective:     Chief Complaint / Reason for Physician Visit  Follow up for fever. Discussed with RN events overnight. No cp/sob/abd pain    Review of Systems:  Symptom Y/N Comments  Symptom Y/N Comments   Fever/Chills    Chest Pain     Poor Appetite    Edema     Cough    Abdominal Pain     Sputum    Joint Pain     SOB/LARSEN    Pruritis/Rash     Nausea/vomit    Tolerating PT/OT     Diarrhea    Tolerating Diet     Constipation    Other       Could NOT obtain due to:      Objective:     VITALS:   Last 24hrs VS reviewed since prior progress note.  Most recent are:  Patient Vitals for the past 24 hrs:   Temp Pulse Resp BP SpO2   12/10/18 1115 98.7 °F (37.1 °C) 76 16 136/56 97 %   12/10/18 0720 98.1 °F (36.7 °C) 81 14 134/56 97 %   12/10/18 0304 98.3 °F (36.8 °C) 80 18 110/52 97 %   18 2252 98.7 °F (37.1 °C) 77 17 117/44 97 %   18 98.5 °F (36.9 °C) 71 16 112/51 97 %   18 98.1 °F (36.7 °C) 69 18 99/48 99 %   12/09/18 1606 98.1 °F (36.7 °C) 73 -- 119/55 --   12/09/18 1420 98.4 °F (36.9 °C) 75 17 (!) 108/37 100 %       Intake/Output Summary (Last 24 hours) at 12/10/2018 1409  Last data filed at 12/10/2018 1253  Gross per 24 hour   Intake 1120 ml   Output --   Net 1120 ml        PHYSICAL EXAM:  General: WD, WN. Alert, cooperative, no acute distress    EENT:  EOMI. Anicteric sclerae. MMM  Resp:  CTA bilaterally, no wheezing or rales. No accessory muscle use  CV:  Regular  rhythm,  No edema  GI:  Soft, Non distended, Non tender.  +Bowel sounds  Neurologic:  Alert and oriented X 3, normal speech,   Psych:   Good insight. Not anxious nor agitated  Skin:  No rashes. No jaundice    Reviewed most current lab test results and cultures  YES  Reviewed most current radiology test results   YES  Review and summation of old records today    NO  Reviewed patient's current orders and MAR    YES  PMH/ reviewed - no change compared to H&P  ________________________________________________________________________  Care Plan discussed with:    Comments   Patient x    Family  x    RN x    Care Manager     Consultant                        Multidiciplinary team rounds were held today with , nursing, pharmacist and clinical coordinator. Patient's plan of care was discussed; medications were reviewed and discharge planning was addressed. ________________________________________________________________________  Total NON critical care TIME:  25   Minutes    Total CRITICAL CARE TIME Spent:   Minutes non procedure based      Comments   >50% of visit spent in counseling and coordination of care     ________________________________________________________________________  Olu Fabian MD     Procedures: see electronic medical records for all procedures/Xrays and details which were not copied into this note but were reviewed prior to creation of Plan.       LABS:  I reviewed today's most current labs and imaging studies.   Pertinent labs include:  Recent Labs     12/07/18  1728   WBC 6.3   HGB 10.3*   HCT 32.7*        Recent Labs     12/07/18  1728   *   K 4.4   CL 93*   CO2 31   *   BUN 40*   CREA 4.50*   CA 8.2*   PHOS 4.3   ALB 3.2*       Signed: Vj Powers MD

## 2018-12-10 NOTE — PROGRESS NOTES
Bedside and Verbal shift change report given to Jayro Mendiola RN (oncoming nurse) by William Zacarias RN (offgoing nurse). Report included the following information SBAR, Kardex, Intake/Output, MAR and Recent Results.

## 2018-12-11 LAB
ALBUMIN SERPL-MCNC: 2.9 G/DL (ref 3.5–5)
ANION GAP SERPL CALC-SCNC: 8 MMOL/L (ref 5–15)
BUN SERPL-MCNC: 65 MG/DL (ref 6–20)
BUN/CREAT SERPL: 13 (ref 12–20)
CALCIUM SERPL-MCNC: 8 MG/DL (ref 8.5–10.1)
CHLORIDE SERPL-SCNC: 93 MMOL/L (ref 97–108)
CO2 SERPL-SCNC: 28 MMOL/L (ref 21–32)
CREAT SERPL-MCNC: 5.19 MG/DL (ref 0.55–1.02)
ERYTHROCYTE [DISTWIDTH] IN BLOOD BY AUTOMATED COUNT: 14.3 % (ref 11.5–14.5)
GLUCOSE BLD STRIP.AUTO-MCNC: 183 MG/DL (ref 65–100)
GLUCOSE BLD STRIP.AUTO-MCNC: 337 MG/DL (ref 65–100)
GLUCOSE BLD STRIP.AUTO-MCNC: 343 MG/DL (ref 65–100)
GLUCOSE BLD STRIP.AUTO-MCNC: 346 MG/DL (ref 65–100)
GLUCOSE SERPL-MCNC: 333 MG/DL (ref 65–100)
HCT VFR BLD AUTO: 30 % (ref 35–47)
HGB BLD-MCNC: 9.8 G/DL (ref 11.5–16)
IRON SATN MFR SERPL: 30 % (ref 20–50)
IRON SERPL-MCNC: 62 UG/DL (ref 35–150)
MCH RBC QN AUTO: 29.4 PG (ref 26–34)
MCHC RBC AUTO-ENTMCNC: 32.7 G/DL (ref 30–36.5)
MCV RBC AUTO: 90.1 FL (ref 80–99)
NRBC # BLD: 0 K/UL (ref 0–0.01)
NRBC BLD-RTO: 0 PER 100 WBC
PHOSPHATE SERPL-MCNC: 5.4 MG/DL (ref 2.6–4.7)
PLATELET # BLD AUTO: 170 K/UL (ref 150–400)
PMV BLD AUTO: 11.3 FL (ref 8.9–12.9)
POTASSIUM SERPL-SCNC: 3.8 MMOL/L (ref 3.5–5.1)
RBC # BLD AUTO: 3.33 M/UL (ref 3.8–5.2)
SERVICE CMNT-IMP: ABNORMAL
SODIUM SERPL-SCNC: 129 MMOL/L (ref 136–145)
TIBC SERPL-MCNC: 208 UG/DL (ref 250–450)
WBC # BLD AUTO: 4.7 K/UL (ref 3.6–11)

## 2018-12-11 PROCEDURE — 74011250636 HC RX REV CODE- 250/636: Performed by: INTERNAL MEDICINE

## 2018-12-11 PROCEDURE — 82962 GLUCOSE BLOOD TEST: CPT

## 2018-12-11 PROCEDURE — 80069 RENAL FUNCTION PANEL: CPT

## 2018-12-11 PROCEDURE — 74011250637 HC RX REV CODE- 250/637: Performed by: INTERNAL MEDICINE

## 2018-12-11 PROCEDURE — 94760 N-INVAS EAR/PLS OXIMETRY 1: CPT

## 2018-12-11 PROCEDURE — 85027 COMPLETE CBC AUTOMATED: CPT

## 2018-12-11 PROCEDURE — 96372 THER/PROPH/DIAG INJ SC/IM: CPT

## 2018-12-11 PROCEDURE — 36415 COLL VENOUS BLD VENIPUNCTURE: CPT

## 2018-12-11 PROCEDURE — 99218 HC RM OBSERVATION: CPT

## 2018-12-11 PROCEDURE — 83550 IRON BINDING TEST: CPT

## 2018-12-11 PROCEDURE — 74011636637 HC RX REV CODE- 636/637: Performed by: INTERNAL MEDICINE

## 2018-12-11 PROCEDURE — G0378 HOSPITAL OBSERVATION PER HR: HCPCS

## 2018-12-11 PROCEDURE — 90935 HEMODIALYSIS ONE EVALUATION: CPT

## 2018-12-11 RX ORDER — INSULIN GLARGINE 100 [IU]/ML
15 INJECTION, SOLUTION SUBCUTANEOUS
Status: DISCONTINUED | OUTPATIENT
Start: 2018-12-11 | End: 2018-12-12 | Stop reason: HOSPADM

## 2018-12-11 RX ADMIN — HEPARIN SODIUM 7500 UNITS: 5000 INJECTION INTRAVENOUS; SUBCUTANEOUS at 21:06

## 2018-12-11 RX ADMIN — ACETAMINOPHEN 650 MG: 325 TABLET ORAL at 00:25

## 2018-12-11 RX ADMIN — POLYVINYL ALCOHOL 1 DROP: 14 SOLUTION/ DROPS OPHTHALMIC at 14:33

## 2018-12-11 RX ADMIN — CLOPIDOGREL BISULFATE 75 MG: 75 TABLET ORAL at 14:32

## 2018-12-11 RX ADMIN — ASPIRIN 81 MG: 81 TABLET, COATED ORAL at 14:32

## 2018-12-11 RX ADMIN — CARVEDILOL 6.25 MG: 6.25 TABLET, FILM COATED ORAL at 17:11

## 2018-12-11 RX ADMIN — INSULIN LISPRO 4 UNITS: 100 INJECTION, SOLUTION INTRAVENOUS; SUBCUTANEOUS at 08:03

## 2018-12-11 RX ADMIN — INSULIN LISPRO 4 UNITS: 100 INJECTION, SOLUTION INTRAVENOUS; SUBCUTANEOUS at 17:11

## 2018-12-11 RX ADMIN — POLYVINYL ALCOHOL 1 DROP: 14 SOLUTION/ DROPS OPHTHALMIC at 21:09

## 2018-12-11 RX ADMIN — ACETAMINOPHEN 650 MG: 325 TABLET ORAL at 09:25

## 2018-12-11 RX ADMIN — INSULIN GLARGINE 15 UNITS: 100 INJECTION, SOLUTION SUBCUTANEOUS at 22:00

## 2018-12-11 RX ADMIN — INSULIN LISPRO 2 UNITS: 100 INJECTION, SOLUTION INTRAVENOUS; SUBCUTANEOUS at 21:09

## 2018-12-11 RX ADMIN — GABAPENTIN 100 MG: 100 CAPSULE ORAL at 21:06

## 2018-12-11 RX ADMIN — CLONIDINE HYDROCHLORIDE 0.1 MG: 0.1 TABLET ORAL at 17:10

## 2018-12-11 RX ADMIN — HYDRALAZINE HYDROCHLORIDE 50 MG: 50 TABLET, FILM COATED ORAL at 17:10

## 2018-12-11 RX ADMIN — INSULIN LISPRO 10 UNITS: 100 INJECTION, SOLUTION INTRAVENOUS; SUBCUTANEOUS at 08:03

## 2018-12-11 RX ADMIN — INSULIN LISPRO 10 UNITS: 100 INJECTION, SOLUTION INTRAVENOUS; SUBCUTANEOUS at 17:05

## 2018-12-11 RX ADMIN — ERYTHROPOIETIN 10000 UNITS: 10000 INJECTION, SOLUTION INTRAVENOUS; SUBCUTANEOUS at 19:02

## 2018-12-11 RX ADMIN — GABAPENTIN 100 MG: 100 CAPSULE ORAL at 17:10

## 2018-12-11 NOTE — DIALYSIS
[facility]  [phone]    Vitals   Pre   Post   Assessment   Pre   Post     Temp  Temp: 97.7 °F (36.5 °C) (12/11/18 0825)  98.6 LOC  Alert and oriented  Alert and oriented   HR   Pulse (Heart Rate): 72 (12/11/18 0825) 77 Lungs   Clear anteriorly prn oxygen via nasal cannula respirations even  even, unlabored, no complaints of shortness of breath    B/P   BP: 154/66 (12/11/18 0825) 110/52 Cardiac   Regular   regular    Resp   Resp Rate: 18 (12/11/18 0825) 18 Skin   Warm and dry   warm and dry    Pain level  Pain Intensity 1: 0 (12/10/18 2025) \"I feel so much skyler\" pain intensity 0 Edema  None detected      None detected    Orders:    Duration:   Start:    0825 End:    1255 Total:   4.5   Dialyzer:   Dialyzer/Set Up Inspection: Revaclear (12/11/18 0825)   K Bath:   2.0 K   Ca Bath:   2.5 calcium   Na/Bicarb:   Dialysate NA (mEq/L): 140 (12/11/18 0825)   Target Fluid Removal:   Goal/Amount of Fluid to Remove (mL): 2500 mL (12/11/18 0825)   Access     Type & Location:   Right tunneled CVC dressing last changed 12/8/18 site without redness or drainage, ports cleansed with alcohol per policy and procedure, aspirated, labs drawn and flushed    Labs     Obtained/Reviewed   Critical Results Called   Date when labs were drawn-  Hgb-    HGB   Date Value Ref Range Status   12/07/2018 10.3 (L) 11.5 - 16.0 g/dL Final     K-    Potassium   Date Value Ref Range Status   12/07/2018 4.4 3.5 - 5.1 mmol/L Final     Ca-   Calcium   Date Value Ref Range Status   12/07/2018 8.2 (L) 8.5 - 10.1 MG/DL Final     Bun-   BUN   Date Value Ref Range Status   12/07/2018 40 (H) 6 - 20 MG/DL Final     Creat-   Creatinine   Date Value Ref Range Status   12/07/2018 4.50 (H) 0.55 - 1.02 MG/DL Final        Medications/ Blood Products Given     Name   Dose   Route and Time     None                 Blood Volume Processed (BVP):    98.5 Net Fluid   Removed:  2000   Comments   Time Out Done: 6695  Primary Nurse Rpt Pre: Celso Hickey RN   Primary Nurse Rpt Post: Celso Hickey RN   Pt Education: cvc infection control  Care Plan: continue current HD plan of care   Tx Summary: 0825 HD initiated as ordered, Dr. eSveriano Casey in to see patient on dialysis. 0900 decreased UF after evaluation of blood pressure with range 0783-5658. 0915 potassium returned discussed with dr. Mariana Moreno changed to 3k 2.5 calcium per her order. 0930 patient complains of dull headache an 8 on a scale of 1 to 10 described as \"achy\". 11:00 headache improved but not resolved, primary nurse over to reevaluate sugar 183. 1130 incontinent of a small amount of urine, cleansed and dry bed sheets. 1255 all possible blood returned, ports cleansed with alcohol, flushed and capped. All dialysis related medications have been reviewed. Admiting Diagnosis: Diabetes Mellitus   Pt's previous clinic- [facility]   Consent signed - Informed Consent Verified: Yes (12/11/18 0825)  [facility] Consent - verified  Hepatitis Status- 12/08/18 Negative   Machine #- Machine Number: [id number] (12/11/18 0825)  Telemetry status-n/a  Pre-dialysis wt. -  n/a  TRANSFER - IN REPORT:  Verbal report received from Celso Hickey on Jerri Guerin  being received from [facility] tele room 3253for ordered procedure      Report consisted of patients Situation, Background, Assessment and   Recommendations(SBAR). Information from the following report(s) SBAR was reviewed with the receiving nurse. Opportunity for questions and clarification was provided. Assessment completed upon patients arrival to unit and care assumed.

## 2018-12-11 NOTE — PROGRESS NOTES
Problem: Falls - Risk of  Goal: *Absence of Falls  Document Cookie Fall Risk and appropriate interventions in the flowsheet.   Outcome: Progressing Towards Goal  Fall Risk Interventions:  Mobility Interventions: Bed/chair exit alarm, Communicate number of staff needed for ambulation/transfer, Strengthening exercises (ROM-active/passive)         Medication Interventions: Bed/chair exit alarm, Evaluate medications/consider consulting pharmacy, Patient to call before getting OOB, Teach patient to arise slowly    Elimination Interventions: Call light in reach, Patient to call for help with toileting needs, Toileting schedule/hourly rounds

## 2018-12-11 NOTE — DIALYSIS
HD TRANSFER - OUT REPORT:    Verbal report given to Kylie Valentino RN on Rizwan Ma being transferred to room 3253for routine progression of care       Report consisted of patient's Situation, Background, Assessment and   Recommendations(SBAR). Information from the following report(s) SBAR was reviewed with the receiving nurse. Method:  $$ Method: Hemodialysis (12/11/18 0825)    Fluid Removed  NET Fluid Removed (mL): 2000 ml (12/11/18 1255)     Patient response to treatment:  Stable    End Time  Hemodialysis End Time: 2812 (12/11/18 1255)  If not documented, dialysis nurse to update post-dialysis row in HD/Filtration flowsheet     Medications /Volume expansion agents or Fluid boluses administered during treatment? no    Post-dialysis medication administration due?  no  Remind nurse to administer post-HD medication upon return to unit. Lines:cleansed with alcohol per policy and procedure, flushed, and cappedl    Opportunity for questions and clarification was provided.       Patient transported with: [employer]

## 2018-12-11 NOTE — PROGRESS NOTES
Pt requested to be discharged tomorrow morning due to her being concerned about the road [place] ALEXANDRA contacted [facility] and attempted to arrange transport before the the temp change but did not receive a return call in enough time to transport the pt before dark. ALEXANDRA contacted [facility] and arranged transport for 11AM tomorrow morning. Pt and attending MD notified. PCS on pt bedside chart. Floor RN stated she received a call from [facility] stating transport would arrive at 8:00A tomorrow. ALEXANDRA left message for India Gentile [phone] providing info from [facility] and transport needs to be 11A.     Grant Gill, MSW  Ext [phone]

## 2018-12-11 NOTE — PROGRESS NOTES
Bedside and Verbal shift change report given to Chintan Murphy (oncoming nurse) by Andrew Langley (offgoing nurse). Report included the following information SBAR, Kardex, MAR and Recent Results.

## 2018-12-11 NOTE — PROGRESS NOTES
[email or web address]                  Phone - [phone]   Renal Progress Note    Subjective:   Patient: Stephany Narayanan                    YOB: 1950               Date- 2018          Reason for visit: ESRF     Admission Date: 2018       PROBLEMS:    End-stage renal failure  --TTS at [facility] dialysis. Anemia secondary to renal failure. Morbid obesity. Diabetes, uncontrolled. H/o hypertension. JAZMYN        PLAN:    Dialysis now. Labs reviewed. Bath changed from a 2K to a 3K  EPO ordered. Iron studies ordered. Above d/w the patient, Dr. Bret Armstrong, and the dialysis RN. Interim History:   She was due for AVF placement, but surgery was cancelled and pt admitted due to sugars>500. These are better, but the patient has stayed in the hospital because of loss of power at home (she needs it for her oxygen). She is seen on dialysis and denies any complaints. ROS: No fever/chills. No HEENT complaints. No SOB or cough. No chest pain. No abd pain. No N/V. No joint pain. No skin rashes/lesions. Review of Systems  A comprehensive review of systems was negative except for that written in the HPI. Objective:     Visit Vitals  /57   Pulse 75   Temp 97.7 °F (36.5 °C) (Oral)   Resp 18   Ht 5' 6\" (1.676 m)   Wt 134.6 kg (296 lb 12.8 oz)   SpO2 100%   Breastfeeding? No   BMI 47.90 kg/m²     Temp (24hrs), Av.9 °F (36.6 °C), Min:97.3 °F (36.3 °C), Max:98.7 °F (37.1 °C)      No intake/output data recorded.  1901 -  0700  In: 1080 [P.O.:1080]  Out: -     Physical Exam:  General: morbidly obese woman in no distress  Neurologic: alert and appropriate; normal speech; no tremor  Neck:  supple; no JVD  Lungs:  clear to auscultation bilaterally  Heart:  regular rate and rhythm  Skin:  no rash or abnormalities  Psych: normal affect and mood  Abdomen:  soft, non-tender.   No masses,  no organomegaly   Extremities: no edema      Data Review:     Recent Labs     18  107 WBC 4.7   HGB 9.8*   *   K 3.8   CL 93*   CO2 28   BUN 65*   CREA 5.19*   CA 8.0*   ALB 2.9*   PHOS 5.4*     Chart reviewed. Pertinent Notes Reviewed. Medications list Personally Reviewed. Jo [place], [address] [facility]  [facility]  [address], [address]  [place], [address] [place]  Phone - [phone]    Fax - [phone]  [email or web address]                                         [facility]   [address] [address]  Phone - [phone]  Fax - [phone]. [place]

## 2018-12-11 NOTE — PROGRESS NOTES
Hospitalist Progress Note    NAME: Jose Rosen   :  1950   MRN:  [id number]       Assessment / Plan:  DM2, uncontrolled with hyperglycemia ( on admission)  -A1c 12.4  -increase lantus to 15units at HS. Continue premeal Humalog 10 units    No Fever  -reported during dialysis session just PTA. Currently afebrile and denies significant cough, vomiting, abd pain. CXR neg. No fever documented here     HTN  -cont antihypertensive meds; CHF ruled out. No pulm edema on CXR. JAZMYN  -on CPAP and prn oxygen at home    ESRD on TTS  -consult Nephrology for HD  -Her surgery (left AV graft) has been cancelled and needs to be rescheduled. Follow up with Dr Kiara Pulido as OP.           Plan:  Unable to dc on  since patient's transportation service unavailable on . Unable to dc Monday since patient has no power in home and road to house unaccessible. DC today if power is restored and house accessible, otherwise re eval situation in AM      Body mass index is 47.9 kg/m². Code status: Full  Prophylaxis: Hep SQ  Recommended Disposition: Home w/Family     Subjective:     Chief Complaint / Reason for Physician Visit  Follow up for fever. Discussed with RN events overnight. No cp/sob/abd pain  Getting dialysis this AM  BG >300    Review of Systems:  Symptom Y/N Comments  Symptom Y/N Comments   Fever/Chills    Chest Pain     Poor Appetite    Edema     Cough n   Abdominal Pain     Sputum    Joint Pain     SOB/LARSEN n   Pruritis/Rash     Nausea/vomit n   Tolerating PT/OT     Diarrhea n   Tolerating Diet     Constipation    Other       Could NOT obtain due to:      Objective:     VITALS:   Last 24hrs VS reviewed since prior progress note.  Most recent are:  Patient Vitals for the past 24 hrs:   Temp Pulse Resp BP SpO2   18 0830 -- 73 18 149/70 --   18 0825 97.7 °F (36.5 °C) 72 18 154/66 --   18 0754 98.2 °F (36.8 °C) 70 18 -- 100 %   18 0343 98 °F (36.7 °C) 70 18 116/46 100 % 12/10/18 2316 97.8 °F (36.6 °C) 74 18 147/56 98 %   12/10/18 1910 97.3 °F (36.3 °C) 75 -- 106/53 99 %   12/10/18 1525 97.9 °F (36.6 °C) 70 14 134/55 100 %   12/10/18 1115 98.7 °F (37.1 °C) 76 16 136/56 97 %       Intake/Output Summary (Last 24 hours) at 12/11/2018 0847  Last data filed at 12/10/2018 1817  Gross per 24 hour   Intake 760 ml   Output --   Net 760 ml        PHYSICAL EXAM:  General: WD, WN. Alert, cooperative, no acute distress    EENT:  EOMI. Anicteric sclerae. MMM  Resp:  CTA bilaterally, no wheezing or rales. No accessory muscle use  CV:  Regular  rhythm,  No edema  GI:  Soft, Non distended, Non tender.  +Bowel sounds  Neurologic:  Alert and oriented X 3, normal speech,   Psych:   Good insight. Not anxious nor agitated  Skin:  No rashes. No jaundice    Reviewed most current lab test results and cultures  YES  Reviewed most current radiology test results   YES  Review and summation of old records today    NO  Reviewed patient's current orders and MAR    YES  PMH/SH reviewed - no change compared to H&P  ________________________________________________________________________  Care Plan discussed with:    Comments   Patient x    Family  x    RN x    Care Manager     Consultant                        Multidiciplinary team rounds were held today with , nursing, pharmacist and clinical coordinator. Patient's plan of care was discussed; medications were reviewed and discharge planning was addressed.      ________________________________________________________________________  Total NON critical care TIME:  25   Minutes    Total CRITICAL CARE TIME Spent:   Minutes non procedure based      Comments   >50% of visit spent in counseling and coordination of care     ________________________________________________________________________  aCllum Smalls MD     Procedures: see electronic medical records for all procedures/Xrays and details which were not copied into this note but were reviewed prior to creation of Plan. LABS:  I reviewed today's most current labs and imaging studies. Pertinent labs include:  Recent Labs     12/11/18  0823   WBC 4.7   HGB 9.8*   HCT 30.0*        No results for input(s): NA, K, CL, CO2, GLU, BUN, CREA, CA, MG, PHOS, ALB, TBIL, TBILI, SGOT, ALT, INR in the last 72 hours.     No lab exists for component: Jadon INREXT    Signed: Clemencia Knott MD

## 2018-12-11 NOTE — PROGRESS NOTES
Bedside and Verbal shift change report given to Lisa  (oncoming nurse) by Ronny Arredondo RN (offgoing nurse). Report included the following information Kardex and MAR.

## 2018-12-12 VITALS
TEMPERATURE: 99.4 F | BODY MASS INDEX: 47.09 KG/M2 | RESPIRATION RATE: 18 BRPM | WEIGHT: 293 LBS | OXYGEN SATURATION: 100 % | DIASTOLIC BLOOD PRESSURE: 75 MMHG | SYSTOLIC BLOOD PRESSURE: 143 MMHG | HEIGHT: 66 IN | HEART RATE: 79 BPM

## 2018-12-12 LAB
GLUCOSE BLD STRIP.AUTO-MCNC: 309 MG/DL (ref 65–100)
GLUCOSE BLD STRIP.AUTO-MCNC: 326 MG/DL (ref 65–100)
GLUCOSE BLD STRIP.AUTO-MCNC: 387 MG/DL (ref 65–100)
GLUCOSE BLD STRIP.AUTO-MCNC: 417 MG/DL (ref 65–100)
SERVICE CMNT-IMP: ABNORMAL

## 2018-12-12 PROCEDURE — 74011250637 HC RX REV CODE- 250/637: Performed by: INTERNAL MEDICINE

## 2018-12-12 PROCEDURE — G0378 HOSPITAL OBSERVATION PER HR: HCPCS

## 2018-12-12 PROCEDURE — 74011636637 HC RX REV CODE- 636/637: Performed by: INTERNAL MEDICINE

## 2018-12-12 PROCEDURE — 82962 GLUCOSE BLOOD TEST: CPT

## 2018-12-12 PROCEDURE — 99218 HC RM OBSERVATION: CPT

## 2018-12-12 RX ORDER — INSULIN GLARGINE 100 [IU]/ML
12 INJECTION, SOLUTION SUBCUTANEOUS
Qty: 1 VIAL | Status: SHIPPED | COMMUNITY
Start: 2018-12-12

## 2018-12-12 RX ORDER — LEVOFLOXACIN 500 MG/1
500 TABLET, FILM COATED ORAL
Qty: 3 TAB | Refills: 0 | Status: SHIPPED | OUTPATIENT
Start: 2018-12-12 | End: 2018-12-17

## 2018-12-12 RX ADMIN — INSULIN LISPRO 4 UNITS: 100 INJECTION, SOLUTION INTRAVENOUS; SUBCUTANEOUS at 11:47

## 2018-12-12 RX ADMIN — ASPIRIN 81 MG: 81 TABLET, COATED ORAL at 08:35

## 2018-12-12 RX ADMIN — CARVEDILOL 6.25 MG: 6.25 TABLET, FILM COATED ORAL at 08:35

## 2018-12-12 RX ADMIN — INSULIN LISPRO 10 UNITS: 100 INJECTION, SOLUTION INTRAVENOUS; SUBCUTANEOUS at 08:33

## 2018-12-12 RX ADMIN — POLYVINYL ALCOHOL 1 DROP: 14 SOLUTION/ DROPS OPHTHALMIC at 08:38

## 2018-12-12 RX ADMIN — INSULIN LISPRO 10 UNITS: 100 INJECTION, SOLUTION INTRAVENOUS; SUBCUTANEOUS at 11:46

## 2018-12-12 RX ADMIN — HYDRALAZINE HYDROCHLORIDE 50 MG: 50 TABLET, FILM COATED ORAL at 08:34

## 2018-12-12 RX ADMIN — CLOPIDOGREL BISULFATE 75 MG: 75 TABLET ORAL at 08:35

## 2018-12-12 RX ADMIN — CLONIDINE HYDROCHLORIDE 0.1 MG: 0.1 TABLET ORAL at 08:35

## 2018-12-12 RX ADMIN — GABAPENTIN 100 MG: 100 CAPSULE ORAL at 08:35

## 2018-12-12 RX ADMIN — INSULIN LISPRO 8 UNITS: 100 INJECTION, SOLUTION INTRAVENOUS; SUBCUTANEOUS at 08:34

## 2018-12-12 NOTE — DISCHARGE SUMMARY
Hospitalist Discharge Summary     Patient ID:  Jerri Guerin  [id number]  32 y.o.  1950    PCP on record: Darwin Valencia MD    Admit date: 12/7/2018  Discharge date and time: 12/12/2018      DISCHARGE DIAGNOSIS:    DM2, uncontrolled with hyperglycemia   HTN  JAZMYN  ESRD on TTS    CONSULTATIONS:  IP CONSULT TO HOSPITALIST  IP CONSULT TO NEPHROLOGY    Excerpted HPI from H&P of William Garibay MD:  CHIEF COMPLAINT:   Elevated BG     HISTORY OF PRESENT ILLNESS:     Jerri Guerin is a 70 y.o.  female with a history of HTN, DM, JAZMYN and ESRD who I was asked to evaluate for BG >500 and recent fever. Patient is seen at the patient holding area. She had dialysis yesterday and last night she did not administer her usual 12 units of lantus. She was not clear why but sounds like she did give herself some humalog instead. She is scheduled for a left arm AV graft this morning and was found to have a . She was given 10 units IV humulin R and her subsequent . Her surgery was cancelled and we were asked to admit for work up and evaluation of the above problems. Patient also reported having some issue with her BG yesterday and that she had a fever also. Temp today ins 98.9. Denies vomiting, CP, SOB, abdominal pain or diarrhea.    ______________________________________________________________________  DISCHARGE SUMMARY/HOSPITAL COURSE:  for full details see H&P, daily progress notes, labs, consult notes. DM2, uncontrolled with hyperglycemia   - on admission  -A1c 12.4  -increase lantus to 15units at HS. Continue premeal Humalog 10 units. Needs slow titration of her lantus and premeal humalog over the next several weeks. Advised her to follow up with her PCP and call to discuss her BS readings.   Once her BG is under better control, she should follow up with Dr Eduardo Handy to reschedule her surgery.        No Fever  -reported during dialysis session just PTA. Currently afebrile and denies significant cough, vomiting, abd pain.   CXR neg. No fever documented here     HTN  -cont antihypertensive meds; CHF ruled out. No pulm edema on CXR.     JAZMYN  -on CPAP and prn oxygen at home    ESRD on TTS  -consult Nephrology for HD  -Her surgery (left AV graft) has been cancelled and needs to be rescheduled. Follow up with Dr Jamil Laughlin as OP.           Addendum:  Patient complaining of some dysuria and frequency that started the day prior to discharge. Will treat with levaquin x 3 doses. _______________________________________________________________________  Patient seen and examined by me on discharge day. Pertinent Findings:  Gen:    Not in distress  Chest: Clear lungs  CVS:   Regular rhythm. No edema  Abd:  Soft, not distended, not tender  Neuro:  Alert, Oriented x 4, grossly non focal exam  _______________________________________________________________________  DISCHARGE MEDICATIONS:   Current Discharge Medication List      START taking these medications    Details   HYDROcodone-acetaminophen (NORCO) 5-325 mg per tablet Take 1 Tab by mouth every eight (8) hours as needed for Pain. Max Daily Amount: 3 Tabs. Qty: 12 Tab, Refills: 0    Associated Diagnoses: CKD (chronic kidney disease) stage V requiring chronic dialysis ([facility] [address]         CONTINUE these medications which have CHANGED    Details   insulin glargine (LANTUS) 100 unit/mL injection 15 Units by SubCUTAneous route nightly. Qty: 1 Vial         CONTINUE these medications which have NOT CHANGED    Details   lansoprazole (PREVACID) 30 mg capsule Take 30 mg by mouth Daily (before breakfast). sucroferric oxyhydroxide (VELPHORO) 500 mg chew chewable tablet Take  by mouth three (3) times daily (with meals). acetaminophen (TYLENOL) 325 mg tablet Take 650 mg by mouth every four (4) hours as needed for Pain. albuterol (PROVENTIL VENTOLIN) 2.5 mg /3 mL (0.083 %) nebulizer solution by Nebulization route once. carvedilol (COREG) 6.25 mg tablet Take  by mouth two (2) times daily (with meals). cloNIDine HCl (CATAPRES) 0.1 mg tablet Take  by mouth two (2) times a day. docusate sodium (COLACE) 100 mg capsule Take 100 mg by mouth two (2) times a day. esomeprazole (NEXIUM) 40 mg capsule Take  by mouth daily. ferrous sulfate 325 mg (65 mg iron) tablet Take  by mouth Daily (before breakfast). furosemide (LASIX) 80 mg tablet Take  by mouth daily. gabapentin (NEURONTIN) 100 mg capsule Take  by mouth three (3) times daily. hydrALAZINE (APRESOLINE) 100 mg tablet Take  by mouth two (2) times a day. INSULIN LISPRO SC 10 Units by SubCUTAneous route three (3) times daily. mirtazapine (REMERON) 15 mg tablet Take  by mouth nightly. rosuvastatin (CRESTOR) 10 mg tablet Take 10 mg by mouth nightly.      sodium chloride-aloe vera (AYR) topical gel Apply  to affected area once. traMADol (ULTRAM) 50 mg tablet Take 50 mg by mouth every six (6) hours as needed for Pain. bisacodyl (DULCOLAX) 10 mg suppository Insert 10 mg into rectum daily. guaiFENesin (ROBITUSSIN) 100 mg/5 mL liquid Take 200 mg by mouth three (3) times daily as needed for Cough. aspirin delayed-release 81 mg tablet Take  by mouth daily. clopidogrel (PLAVIX) 75 mg tab Take  by mouth. ipratropium (ATROVENT HFA) 17 mcg/actuation inhaler Take 1 Puff by inhalation. nystatin (MYCOSTATIN) powder Apply  to affected area four (4) times daily. STOP taking these medications       alteplase (CATHFLO) 2 mg injection Comments:   Reason for Stopping:               My Recommended Diet, Activity, Wound Care, and follow-up labs are listed in the patient's Discharge Insturctions which I have personally completed and reviewed.   Risk of deterioration: Low    Condition at Discharge:  Stable  _____________________________________________________________________    Disposition  Home with family, no needs  ____________________________________________________________________    Care Plan discussed with:   Patient, Family, RN, Care Manager    ____________________________________________________________________    Code Status: Full Code  ____________________________________________________________________      Condition at Discharge:  Stable  _____________________________________________________________________  Follow up with:   PCP : Arsen Robison MD  Follow-up Information     Follow up With Specialties Details Why Contact Info    Arsen Robison MD Internal Medicine In 1 week  [address]  [phone]                Total time in minutes spent coordinating this discharge (includes going over instructions, follow-up, prescriptions, and preparing report for sign off to her PCP) :  35 minutes    Signed:  Amador Dailey MD

## 2018-12-12 NOTE — PROGRESS NOTES
Nephrology Progress Note     David Kuo     [email or web address]                  Phone - [phone]     Patient: Myrna Hameed     YOB: 1950     Admit Date: 12/7/2018         Date- 12/12/2018     CC: Follow up for ESRD         Subjective: Interval History:   -  S/p hd yesterday  BP STABLE  No c/o sob,   No c/o chest pain,   No c/o nausea or vomiting  No c/o  fever. ROS:- as above   Assessment:   End-stage renal failure  --TTS at [facility] dialysis.     Anemia secondary to renal failure.     Morbid obesity.     Diabetes, uncontrolled. H/o hypertension.     JAZMYN       Plan:   · Continue coreg,hydralazine, clonidine  · Continue epogen  · No hd today  · Hd TTS  · Okay to d/c home today - renal stand point    Physical Exam:   GEN: NAD  NECK- Supple, no thyromegaly  RESP: Clear b/l, no wheezing, No accessory muscle use  CVS: RRR,S1,S2    SKIN: No Rash,  ABDO: soft , non tender, No hepatosplenomegaly  EXT: No Edema   NEURO: non focal, normal speech  Right ij permacath +    Care Plan discussed with: patient and daughter  Objective:   Patient Vitals for the past 24 hrs:   Temp Pulse Resp BP SpO2   12/12/18 0734 99.4 °F (37.4 °C) 79 18 143/75 100 %   12/12/18 0330 98 °F (36.7 °C) 75 18 120/62 97 %   12/11/18 2326 98.1 °F (36.7 °C) 76 18 116/61 97 %   12/11/18 2143 98.3 °F (36.8 °C) 93 18 145/79 96 %   12/11/18 1708 -- 89 -- 158/80 --   12/11/18 1426 98.2 °F (36.8 °C) 84 18 150/62 97 %   12/11/18 1255 98.6 °F (37 °C) 77 18 110/52 --   12/11/18 1245 -- 82 18 117/64 --   12/11/18 1230 -- 79 18 124/62 --   12/11/18 1215 -- 81 18 119/64 --   12/11/18 1200 -- 78 18 121/59 --   12/11/18 1145 -- 79 18 111/61 --   12/11/18 1130 -- 84 18 135/67 --   12/11/18 1115 -- 79 18 136/63 --   12/11/18 1100 -- 80 18 125/61 --   12/11/18 1045 -- 76 18 106/53 --   12/11/18 1030 -- 74 18 111/56 --     Last 3 Recorded Weights in this Encounter    12/09/18 0532 12/10/18 1614 12/12/18 0234   Weight: 132.8 kg (292 lb 11.2 oz) 134.6 kg (296 lb 12.8 oz) 133.8 kg (294 lb 15.6 oz)     12/10 1901 - 12/12 0700  In: 2040 [P.O.:2040]  Out: 2000   Chart reviewed. Pertinent Notes reviewed. Medication list  reviewed   Current Facility-Administered Medications   Medication    insulin glargine (LANTUS) injection 15 Units    epoetin chuck (EPOGEN;PROCRIT) injection 10,000 Units    insulin lispro (HUMALOG) injection 10 Units    polyvinyl alcohol (LIQUIFILM TEARS) 1.4 % ophthalmic solution 1 Drop    sodium chloride (NS) flush 5-10 mL    sodium chloride (NS) flush 5-10 mL    acetaminophen (TYLENOL) tablet 650 mg    ondansetron (ZOFRAN) injection 4 mg    insulin lispro (HUMALOG) injection    glucose chewable tablet 16 g    dextrose (D50W) injection syrg 12.5-25 g    glucagon (GLUCAGEN) injection 1 mg    aspirin delayed-release tablet 81 mg    carvedilol (COREG) tablet 6.25 mg    cloNIDine HCl (CATAPRES) tablet 0.1 mg    clopidogrel (PLAVIX) tablet 75 mg    gabapentin (NEURONTIN) capsule 100 mg    hydrALAZINE (APRESOLINE) tablet 50 mg    heparin (porcine) injection 7,500 Units           Data Review :  Recent Labs     12/11/18  0823   WBC 4.7   HGB 9.8*      *   K 3.8   *   BUN 65*   CREA 5.19*   CA 8.0*   PHOS 5.4*     No results found for: CULT  No results found for: [place]  Recent Labs     12/11/18  1031   TIBC 208*   PSAT 30     No results found for: Betsy Santiago MD  [facility]   [email or web address]    [facility]  [address], [address]  [place], [address]  Phone - [phone]         Fax - [phone]

## 2018-12-12 NOTE — PROGRESS NOTES
Bedside and Verbal shift change report given to Lisa (oncoming nurse) by Trent Jones RN (offgoing nurse). Report included the following information Kardex and MAR.

## 2018-12-12 NOTE — PROGRESS NOTES
I spoke with Germaine Warren with [facility]. The ambulance will transport the pt home at [facility]. Please send facesheet, ambulance form, Rx, and d/c instructions.   Thanks

## 2018-12-12 NOTE — PROGRESS NOTES
Bedside shift change report given to [employer] (oncoming nurse) by Laurita Reza RN (offgoing nurse). Report included the following information SBAR, Kardex, Procedure Summary, Intake/Output, MAR and Recent Results.

## 2018-12-12 NOTE — PROGRESS NOTES
Patient discharge home via ambulance. Discharge instructions and prescriptions reviewed and given to patient and her daughter. Patient discharge with R chest HD access.

## 2018-12-12 NOTE — PROGRESS NOTES
Problem: Falls - Risk of  Goal: *Absence of Falls  Document Cookie Fall Risk and appropriate interventions in the flowsheet.   Outcome: Progressing Towards Goal  Fall Risk Interventions:  Mobility Interventions: Communicate number of staff needed for ambulation/transfer, Bed/chair exit alarm, Strengthening exercises (ROM-active/passive)         Medication Interventions: Bed/chair exit alarm, Patient to call before getting OOB, Teach patient to arise slowly    Elimination Interventions: Call light in reach, Bed/chair exit alarm, Toilet paper/wipes in reach, Patient to call for help with toileting needs

## 2018-12-12 NOTE — PROGRESS NOTES
Bedside shift change report given to [employer] (oncoming nurse) by Candelaria Mtz RN (offgoing nurse). Report included the following information SBAR, Kardex, Procedure Summary, Intake/Output, MAR and Recent Results.

## 2018-12-12 NOTE — DISCHARGE INSTRUCTIONS
HOSPITALIST DISCHARGE INSTRUCTIONS    NAME: Shaun Vick   :  1950   MRN:  [id number]     Date/Time:  2018 8:25 AM    ADMIT DATE: 2018   DISCHARGE DATE: 2018         · It is important that you take the medication exactly as they are prescribed. · Keep your medication in the bottles provided by the pharmacist and keep a list of the medication names, dosages, and times to be taken in your wallet. · Do not take other medications without consulting your doctor. What to do at [address]:  Renal Diet    Recommended activity: Activity as tolerated      If you have questions regarding the hospital related prescriptions or hospital related issues please call [facility] at [phone]. You can always direct your questions to your primary care doctor if you are unable to reach your hospital physician; your PCP works as an extension of your hospital doctor just like your hospital doctor is an extension of your PCP for your time at the hospital [facility])    If you experience any of the following symptoms then please call your primary care physician or return to the emergency room if you cannot get hold of your doctor:    Fever, chills, nausea, vomiting, or persistent diarrhea  Worsening weakness or new problems with your speech or balance  Dark stools or visible blood in your stools  New Leg swelling or shortness of breath as these could be signs of a clot    Additional Instructions:      Bring these papers with you to your follow up appointments. The papers will help your doctors be sure to continue the care plan from the hospital.              Information obtained by :  I understand that if any problems occur once I am at home I am to contact my physician. I understand and acknowledge receipt of the instructions indicated above. Physician's or R.N.'s Signature                                                                  Date/Time                                                                                                                                              Patient or Representative Signature

## 2018-12-26 NOTE — PROGRESS NOTES
Called patient and scheduled sleep study   TRANSFER - IN REPORT:    Verbal report received from Fausto Villarreal RN(name) on Snehal Koehler  being received from  PACU(unit) for routine progression of care      Report consisted of patients Situation, Background, Assessment and   Recommendations(SBAR). Information from the following report(s) SBAR, Kardex, Intake/Output, MAR and Recent Results was reviewed with the receiving nurse. Opportunity for questions and clarification was provided. Assessment completed upon patients arrival to unit and care assumed.

## 2019-01-02 ENCOUNTER — TELEPHONE (OUTPATIENT)
Dept: SURGERY | Age: 69
End: 2019-01-02

## 2019-01-02 NOTE — TELEPHONE ENCOUNTER
Spoke with Ms Jeremías Bai daughter, Tc Landa. She states BS have been 265 during the day & 315 at night. Spoke with Zoe Rahman at [facility]. They do not check BS at dialysis.

## 2019-01-03 NOTE — TELEPHONE ENCOUNTER
Spoke with Margaret Salcedo at dialysis. Dr Viry Palma requests B/S be tested the next 3 days Ms Katarina Judge is at dialysis. Nurse will check today & call with results.

## 2019-01-16 NOTE — TELEPHONE ENCOUNTER
Received BS results from dialysis on 1/9/19. Can schedule surgery for pt per Dr Gisele Olivera but pt must adhere to strict diabetic diet the day before surgery. Spoke with Ms Andrew Cosme daughter, Belgica Dowd. Pt requests date after 2/3/19. Surgery scheduled for Friday, 2/8/19 at 7:30 am, arrival time is 5:45. Faxed information to dialysis at (f) [phone], confirmation received.

## 2019-02-04 ENCOUNTER — TELEPHONE (OUTPATIENT)
Dept: SURGERY | Age: 69
End: 2019-02-04

## 2019-02-08 ENCOUNTER — TELEPHONE (OUTPATIENT)
Dept: SURGERY | Age: 69
End: 2019-02-08

## 2019-02-08 NOTE — TELEPHONE ENCOUNTER
Left v/m for Ms Familia Piña with possible date of surgery on 3/1/19 at 12:00 pm.  Requested call back.

## 2019-02-11 NOTE — TELEPHONE ENCOUNTER
Spoke with Ms Mono Minor. Offered surgery date of 3/1/19, pt declined, would prefer the following week for surgery, 3/8/19. Pt also requested faxing surgery information to dialysis. Faxed to [phone], confirmation received. Also faxed post op appt with Dr Jj Ibarra.

## 2019-03-05 NOTE — PERIOP NOTES
[facility]  Preoperative Instructions        Surgery Date 3/8/19          Time of Arrival 5:45AM    1. On the day of your surgery, please report to the Surgical Services Registration Desk and sign in at your designated time. The Surgery Center is located to the right of the Emergency Room. 2. You must have someone with you to drive you home. You should not drive a car for 24 hours following surgery. Please make arrangements for a friend or family member to stay with you for the first 24 hours after your surgery. 3. Do not have anything to eat or drink (including water, gum, mints, coffee, juice) after midnight 3/7/19   . ? This may not apply to medications prescribed by your physician. ?(Please note below the special instructions with medications to take the morning of your procedure.)    4. We recommend you do not drink any alcoholic beverages for 24 hours before and after your surgery. 5. Contact your surgeons office for instructions on the following medications: non-steroidal anti-inflammatory drugs (i.e. Advil, Aleve), vitamins, and supplements. (Some surgeons will want you to stop these medications prior to surgery and others may allow you to take them)  **If you are currently taking Plavix, Coumadin, Aspirin and/or other blood-thinning agents, contact your surgeon for instructions. ** Your surgeon will partner with the physician prescribing these medications to determine if it is safe to stop or if you need to continue taking. Please do not stop taking these medications without instructions from your surgeon    6. Wear comfortable clothes. Wear glasses instead of contacts. Do not bring any money or jewelry. Please bring picture ID, insurance card, and any prearranged co-payment or hospital payment. Do not wear make-up, particularly mascara the morning of your surgery. Do not wear nail polish, particularly if you are having foot /hand surgery.   Wear your hair loose or down, no ponytails, buns, vanessa pins or clips. All body piercings must be removed. Please shower with antibacterial soap for three consecutive days before and on the morning of surgery, but do not apply any lotions, powders or deodorants after the shower on the day of surgery. Please use a fresh towels after each shower. Please sleep in clean clothes and change bed linens the night before surgery. Please do not shave for 48 hours prior to surgery. Shaving of the face is acceptable. 7. You should understand that if you do not follow these instructions your surgery may be cancelled. If your physical condition changes (I.e. fever, cold or flu) please contact your surgeon as soon as possible. 8. It is important that you be on time. If a situation occurs where you may be late, please call [phone] (OR Holding Area). 9. If you have any questions and or problems, please call [phone] (Pre-admission Testing). 10. Your surgery time may be subject to change. You will receive a phone call the evening prior if your time changes. 11.  If having outpatient surgery, you must have someone to drive you here, stay with you during the duration of your stay, and to drive you home at time of discharge. 12.   In an effort to improve the efficiency, privacy, and safety for all of our Pre-op patients visitors are not allowed in the Holding area. Once you arrive and are registered your family/visitors will be asked to remain in the waiting room. The Pre-op staff will get you from the Surgical Waiting Area and will explain to you and your family/visitors that the Pre-op phase is beginning. The staff will answer any questions and provide instructions for tracking of the patient, by use of the existing tracking number and color-coded status board in the waiting room.   At this time the staff will also ask for your designated spokesperson information in the event that the physician or staff need to provide an update or obtain any pertinent information. The designated spokesperson will be notified if the physician needs to speak to family during the pre-operative phase. If at any time your family/visitors has questions or concerns they may approach the volunteer desk in the waiting area for assistance. Special Instructions:Bring and use inhalers as needed. MEDICATIONS TO TAKE THE MORNING OF SURGERY WITH A SIP OF WATER:Coreg, Clonidine, Lasix, Gabapentin, Hydralazine. Also Prevacid and Tylenol as needed. I understand a pre-operative phone call will be made to verify my surgery time. In the event that I am not available, I give permission for a message to be left on my answering service and/or with another person?  Yes          ___________________      __________   _________    (Signature of Patient)             (Witness)                (Date and Time)

## 2019-03-08 ENCOUNTER — ANESTHESIA (OUTPATIENT)
Dept: SURGERY | Age: 69
End: 2019-03-08
Payer: MEDICARE

## 2019-03-08 ENCOUNTER — HOSPITAL ENCOUNTER (OUTPATIENT)
Age: 69
Setting detail: OBSERVATION
Discharge: HOME | End: 2019-03-09
Attending: SURGERY | Admitting: SURGERY
Payer: MEDICARE

## 2019-03-08 ENCOUNTER — ANESTHESIA EVENT (OUTPATIENT)
Dept: SURGERY | Age: 69
End: 2019-03-08
Payer: MEDICARE

## 2019-03-08 DIAGNOSIS — N18.6 CKD (CHRONIC KIDNEY DISEASE) STAGE V REQUIRING CHRONIC DIALYSIS (HCC): ICD-10-CM

## 2019-03-08 DIAGNOSIS — Z99.2 CKD (CHRONIC KIDNEY DISEASE) STAGE V REQUIRING CHRONIC DIALYSIS (HCC): ICD-10-CM

## 2019-03-08 LAB
ANION GAP BLD CALC-SCNC: 14 MMOL/L (ref 10–20)
BUN BLD-MCNC: 36 MG/DL (ref 9–20)
CA-I BLD-MCNC: 1.15 MMOL/L (ref 1.12–1.32)
CHLORIDE BLD-SCNC: 97 MMOL/L (ref 98–107)
CO2 BLD-SCNC: 29 MMOL/L (ref 21–32)
CREAT BLD-MCNC: 4.6 MG/DL (ref 0.6–1.3)
GLUCOSE BLD STRIP.AUTO-MCNC: 151 MG/DL (ref 65–100)
GLUCOSE BLD STRIP.AUTO-MCNC: 200 MG/DL (ref 65–100)
GLUCOSE BLD STRIP.AUTO-MCNC: 336 MG/DL (ref 65–100)
GLUCOSE BLD STRIP.AUTO-MCNC: 343 MG/DL (ref 65–100)
GLUCOSE BLD-MCNC: 286 MG/DL (ref 65–100)
HCT VFR BLD CALC: 38 % (ref 35–47)
POTASSIUM BLD-SCNC: 4.6 MMOL/L (ref 3.5–5.1)
SERVICE CMNT-IMP: ABNORMAL
SODIUM BLD-SCNC: 134 MMOL/L (ref 136–145)

## 2019-03-08 PROCEDURE — 77030002916 HC SUT ETHLN J&J -A: Performed by: SURGERY

## 2019-03-08 PROCEDURE — G0378 HOSPITAL OBSERVATION PER HR: HCPCS

## 2019-03-08 PROCEDURE — 76060000040 HC ANESTHESIA 4.5 TO 5 HR: Performed by: SURGERY

## 2019-03-08 PROCEDURE — 77030020153 HC PRB DOPLR DISP MIZU -C: Performed by: SURGERY

## 2019-03-08 PROCEDURE — 99218 HC RM OBSERVATION: CPT

## 2019-03-08 PROCEDURE — 74011000250 HC RX REV CODE- 250

## 2019-03-08 PROCEDURE — 74011250636 HC RX REV CODE- 250/636

## 2019-03-08 PROCEDURE — 77030011640 HC PAD GRND REM COVD -A: Performed by: SURGERY

## 2019-03-08 PROCEDURE — 74011250636 HC RX REV CODE- 250/636: Performed by: SURGERY

## 2019-03-08 PROCEDURE — 76010000136 HC OR TIME 4.5 TO 5 HR: Performed by: SURGERY

## 2019-03-08 PROCEDURE — 82962 GLUCOSE BLOOD TEST: CPT

## 2019-03-08 PROCEDURE — 77030032490 HC SLV COMPR SCD KNE COVD -B: Performed by: SURGERY

## 2019-03-08 PROCEDURE — 77030002987 HC SUT PROL J&J -B: Performed by: SURGERY

## 2019-03-08 PROCEDURE — 76210000016 HC OR PH I REC 1 TO 1.5 HR: Performed by: SURGERY

## 2019-03-08 PROCEDURE — 74011250637 HC RX REV CODE- 250/637: Performed by: SURGERY

## 2019-03-08 PROCEDURE — 77030008771 HC TU NG SALEM SUMP -A: Performed by: ANESTHESIOLOGY

## 2019-03-08 PROCEDURE — 74011636637 HC RX REV CODE- 636/637: Performed by: ANESTHESIOLOGY

## 2019-03-08 PROCEDURE — 74011250636 HC RX REV CODE- 250/636: Performed by: ANESTHESIOLOGY

## 2019-03-08 PROCEDURE — 77030002996 HC SUT SLK J&J -A: Performed by: SURGERY

## 2019-03-08 PROCEDURE — 77030018390 HC SPNG HEMSTAT2 J&J -B: Performed by: SURGERY

## 2019-03-08 PROCEDURE — 77030008684 HC TU ET CUF COVD -B: Performed by: ANESTHESIOLOGY

## 2019-03-08 PROCEDURE — 77030020255 HC SOL INJ LR 1000ML BG: Performed by: SURGERY

## 2019-03-08 PROCEDURE — P9045 ALBUMIN (HUMAN), 5%, 250 ML: HCPCS

## 2019-03-08 PROCEDURE — 80047 BASIC METABLC PNL IONIZED CA: CPT

## 2019-03-08 PROCEDURE — 77030002706 HC INSRT CLMP EDWD -A: Performed by: SURGERY

## 2019-03-08 PROCEDURE — 77030026438 HC STYL ET INTUB CARD -A: Performed by: ANESTHESIOLOGY

## 2019-03-08 PROCEDURE — 77030002986 HC SUT PROL J&J -A: Performed by: SURGERY

## 2019-03-08 PROCEDURE — 74011000250 HC RX REV CODE- 250: Performed by: SURGERY

## 2019-03-08 PROCEDURE — 74011636637 HC RX REV CODE- 636/637: Performed by: INTERNAL MEDICINE

## 2019-03-08 PROCEDURE — 77030002924 HC SUT GORTX WLGO -B: Performed by: SURGERY

## 2019-03-08 PROCEDURE — 77030002888 HC SUT CHRMC J&J -A: Performed by: SURGERY

## 2019-03-08 PROCEDURE — C1768 GRAFT, VASCULAR: HCPCS | Performed by: SURGERY

## 2019-03-08 PROCEDURE — 77030029684 HC NEB SM VOL KT MONA -A

## 2019-03-08 PROCEDURE — 77030008467 HC STPLR SKN COVD -B: Performed by: SURGERY

## 2019-03-08 DEVICE — GRAFT VASC L80CM ID6MM PTFE THN WALLED NONRINGED STRTCH: Type: IMPLANTABLE DEVICE | Site: ARM | Status: FUNCTIONAL

## 2019-03-08 RX ORDER — ROCURONIUM BROMIDE 10 MG/ML
INJECTION, SOLUTION INTRAVENOUS AS NEEDED
Status: DISCONTINUED | OUTPATIENT
Start: 2019-03-08 | End: 2019-03-08 | Stop reason: HOSPADM

## 2019-03-08 RX ORDER — ALBUTEROL SULFATE 0.83 MG/ML
2.5 SOLUTION RESPIRATORY (INHALATION)
Status: DISCONTINUED | OUTPATIENT
Start: 2019-03-08 | End: 2019-03-09 | Stop reason: HOSPADM

## 2019-03-08 RX ORDER — INSULIN GLARGINE 100 [IU]/ML
12 INJECTION, SOLUTION SUBCUTANEOUS
Status: DISCONTINUED | OUTPATIENT
Start: 2019-03-08 | End: 2019-03-09 | Stop reason: HOSPADM

## 2019-03-08 RX ORDER — ACETAMINOPHEN 325 MG/1
650 TABLET ORAL
Status: DISCONTINUED | OUTPATIENT
Start: 2019-03-08 | End: 2019-03-09 | Stop reason: HOSPADM

## 2019-03-08 RX ORDER — CLONIDINE HYDROCHLORIDE 0.1 MG/1
0.1 TABLET ORAL 2 TIMES DAILY
Status: DISCONTINUED | OUTPATIENT
Start: 2019-03-08 | End: 2019-03-09 | Stop reason: HOSPADM

## 2019-03-08 RX ORDER — FACIAL-BODY WIPES
10 EACH TOPICAL DAILY PRN
Status: DISCONTINUED | OUTPATIENT
Start: 2019-03-08 | End: 2019-03-09 | Stop reason: HOSPADM

## 2019-03-08 RX ORDER — DIPHENHYDRAMINE HYDROCHLORIDE 50 MG/ML
12.5 INJECTION, SOLUTION INTRAMUSCULAR; INTRAVENOUS
Status: ACTIVE | OUTPATIENT
Start: 2019-03-08 | End: 2019-03-09

## 2019-03-08 RX ORDER — GABAPENTIN 100 MG/1
100 CAPSULE ORAL 3 TIMES DAILY
Status: DISCONTINUED | OUTPATIENT
Start: 2019-03-08 | End: 2019-03-09 | Stop reason: HOSPADM

## 2019-03-08 RX ORDER — INSULIN LISPRO 100 [IU]/ML
5 INJECTION, SOLUTION INTRAVENOUS; SUBCUTANEOUS
Status: DISCONTINUED | OUTPATIENT
Start: 2019-03-08 | End: 2019-03-09

## 2019-03-08 RX ORDER — ROSUVASTATIN CALCIUM 10 MG/1
10 TABLET, COATED ORAL
Status: DISCONTINUED | OUTPATIENT
Start: 2019-03-08 | End: 2019-03-08 | Stop reason: CLARIF

## 2019-03-08 RX ORDER — ALBUMIN HUMAN 50 G/1000ML
SOLUTION INTRAVENOUS AS NEEDED
Status: DISCONTINUED | OUTPATIENT
Start: 2019-03-08 | End: 2019-03-08 | Stop reason: HOSPADM

## 2019-03-08 RX ORDER — DEXTROSE 50 % IN WATER (D50W) INTRAVENOUS SYRINGE
12.5-25 AS NEEDED
Status: DISCONTINUED | OUTPATIENT
Start: 2019-03-08 | End: 2019-03-08 | Stop reason: SDUPTHER

## 2019-03-08 RX ORDER — ATORVASTATIN CALCIUM 20 MG/1
20 TABLET, FILM COATED ORAL
Status: DISCONTINUED | OUTPATIENT
Start: 2019-03-08 | End: 2019-03-09 | Stop reason: HOSPADM

## 2019-03-08 RX ORDER — FENTANYL CITRATE 50 UG/ML
INJECTION, SOLUTION INTRAMUSCULAR; INTRAVENOUS AS NEEDED
Status: DISCONTINUED | OUTPATIENT
Start: 2019-03-08 | End: 2019-03-08 | Stop reason: HOSPADM

## 2019-03-08 RX ORDER — GUAIFENESIN 100 MG/5ML
200 SOLUTION ORAL
Status: DISCONTINUED | OUTPATIENT
Start: 2019-03-08 | End: 2019-03-09 | Stop reason: HOSPADM

## 2019-03-08 RX ORDER — ONDANSETRON 2 MG/ML
4 INJECTION INTRAMUSCULAR; INTRAVENOUS AS NEEDED
Status: DISCONTINUED | OUTPATIENT
Start: 2019-03-08 | End: 2019-03-08 | Stop reason: HOSPADM

## 2019-03-08 RX ORDER — PROPOFOL 10 MG/ML
INJECTION, EMULSION INTRAVENOUS AS NEEDED
Status: DISCONTINUED | OUTPATIENT
Start: 2019-03-08 | End: 2019-03-08 | Stop reason: HOSPADM

## 2019-03-08 RX ORDER — DEXTROSE 50 % IN WATER (D50W) INTRAVENOUS SYRINGE
12.5-25 AS NEEDED
Status: DISCONTINUED | OUTPATIENT
Start: 2019-03-08 | End: 2019-03-09 | Stop reason: HOSPADM

## 2019-03-08 RX ORDER — INSULIN LISPRO 100 [IU]/ML
INJECTION, SOLUTION INTRAVENOUS; SUBCUTANEOUS
Status: DISCONTINUED | OUTPATIENT
Start: 2019-03-08 | End: 2019-03-09 | Stop reason: HOSPADM

## 2019-03-08 RX ORDER — FENTANYL CITRATE 50 UG/ML
25 INJECTION, SOLUTION INTRAMUSCULAR; INTRAVENOUS
Status: DISCONTINUED | OUTPATIENT
Start: 2019-03-08 | End: 2019-03-08 | Stop reason: HOSPADM

## 2019-03-08 RX ORDER — HYDROMORPHONE HYDROCHLORIDE 1 MG/ML
0.3 INJECTION, SOLUTION INTRAMUSCULAR; INTRAVENOUS; SUBCUTANEOUS
Status: DISCONTINUED | OUTPATIENT
Start: 2019-03-08 | End: 2019-03-09 | Stop reason: HOSPADM

## 2019-03-08 RX ORDER — ALBUTEROL SULFATE 0.83 MG/ML
2.5 SOLUTION RESPIRATORY (INHALATION)
Status: DISCONTINUED | OUTPATIENT
Start: 2019-03-08 | End: 2019-03-08 | Stop reason: HOSPADM

## 2019-03-08 RX ORDER — ONDANSETRON 2 MG/ML
INJECTION INTRAMUSCULAR; INTRAVENOUS AS NEEDED
Status: DISCONTINUED | OUTPATIENT
Start: 2019-03-08 | End: 2019-03-08 | Stop reason: HOSPADM

## 2019-03-08 RX ORDER — LIDOCAINE HYDROCHLORIDE 10 MG/ML
0.1 INJECTION, SOLUTION EPIDURAL; INFILTRATION; INTRACAUDAL; PERINEURAL AS NEEDED
Status: DISCONTINUED | OUTPATIENT
Start: 2019-03-08 | End: 2019-03-08 | Stop reason: HOSPADM

## 2019-03-08 RX ORDER — PHENYLEPHRINE HCL IN 0.9% NACL 0.4MG/10ML
SYRINGE (ML) INTRAVENOUS AS NEEDED
Status: DISCONTINUED | OUTPATIENT
Start: 2019-03-08 | End: 2019-03-08

## 2019-03-08 RX ORDER — SODIUM CHLORIDE 9 MG/ML
25 INJECTION, SOLUTION INTRAVENOUS CONTINUOUS
Status: DISCONTINUED | OUTPATIENT
Start: 2019-03-08 | End: 2019-03-08 | Stop reason: HOSPADM

## 2019-03-08 RX ORDER — PROTAMINE SULFATE 10 MG/ML
INJECTION, SOLUTION INTRAVENOUS AS NEEDED
Status: DISCONTINUED | OUTPATIENT
Start: 2019-03-08 | End: 2019-03-08 | Stop reason: HOSPADM

## 2019-03-08 RX ORDER — CEFAZOLIN SODIUM 1 G/3ML
1 INJECTION, POWDER, FOR SOLUTION INTRAMUSCULAR; INTRAVENOUS ONCE
Status: COMPLETED | OUTPATIENT
Start: 2019-03-08 | End: 2019-03-08

## 2019-03-08 RX ORDER — CARVEDILOL 12.5 MG/1
12.5 TABLET ORAL 2 TIMES DAILY WITH MEALS
Status: DISCONTINUED | OUTPATIENT
Start: 2019-03-08 | End: 2019-03-09 | Stop reason: HOSPADM

## 2019-03-08 RX ORDER — MIDAZOLAM HYDROCHLORIDE 1 MG/ML
INJECTION, SOLUTION INTRAMUSCULAR; INTRAVENOUS AS NEEDED
Status: DISCONTINUED | OUTPATIENT
Start: 2019-03-08 | End: 2019-03-08 | Stop reason: HOSPADM

## 2019-03-08 RX ORDER — SUCCINYLCHOLINE CHLORIDE 20 MG/ML
INJECTION INTRAMUSCULAR; INTRAVENOUS AS NEEDED
Status: DISCONTINUED | OUTPATIENT
Start: 2019-03-08 | End: 2019-03-08 | Stop reason: HOSPADM

## 2019-03-08 RX ORDER — INSULIN LISPRO 100 [IU]/ML
INJECTION, SOLUTION INTRAVENOUS; SUBCUTANEOUS
Status: DISCONTINUED | OUTPATIENT
Start: 2019-03-08 | End: 2019-03-08 | Stop reason: ALTCHOICE

## 2019-03-08 RX ORDER — ONDANSETRON 2 MG/ML
4 INJECTION INTRAMUSCULAR; INTRAVENOUS
Status: DISCONTINUED | OUTPATIENT
Start: 2019-03-08 | End: 2019-03-09 | Stop reason: HOSPADM

## 2019-03-08 RX ORDER — FENTANYL CITRATE 50 UG/ML
50 INJECTION, SOLUTION INTRAMUSCULAR; INTRAVENOUS AS NEEDED
Status: DISCONTINUED | OUTPATIENT
Start: 2019-03-08 | End: 2019-03-08 | Stop reason: HOSPADM

## 2019-03-08 RX ORDER — LIDOCAINE HYDROCHLORIDE 20 MG/ML
INJECTION, SOLUTION EPIDURAL; INFILTRATION; INTRACAUDAL; PERINEURAL AS NEEDED
Status: DISCONTINUED | OUTPATIENT
Start: 2019-03-08 | End: 2019-03-08 | Stop reason: HOSPADM

## 2019-03-08 RX ORDER — HYDRALAZINE HYDROCHLORIDE 50 MG/1
100 TABLET, FILM COATED ORAL 2 TIMES DAILY
Status: DISCONTINUED | OUTPATIENT
Start: 2019-03-08 | End: 2019-03-09 | Stop reason: HOSPADM

## 2019-03-08 RX ORDER — SODIUM CHLORIDE, SODIUM LACTATE, POTASSIUM CHLORIDE, CALCIUM CHLORIDE 600; 310; 30; 20 MG/100ML; MG/100ML; MG/100ML; MG/100ML
25 INJECTION, SOLUTION INTRAVENOUS CONTINUOUS
Status: DISCONTINUED | OUTPATIENT
Start: 2019-03-08 | End: 2019-03-08 | Stop reason: HOSPADM

## 2019-03-08 RX ORDER — HYDROCODONE BITARTRATE AND ACETAMINOPHEN 5; 325 MG/1; MG/1
1-2 TABLET ORAL
Status: DISCONTINUED | OUTPATIENT
Start: 2019-03-08 | End: 2019-03-09 | Stop reason: HOSPADM

## 2019-03-08 RX ORDER — LANOLIN ALCOHOL/MO/W.PET/CERES
1 CREAM (GRAM) TOPICAL
Status: DISCONTINUED | OUTPATIENT
Start: 2019-03-09 | End: 2019-03-09 | Stop reason: HOSPADM

## 2019-03-08 RX ORDER — MAGNESIUM SULFATE 100 %
4 CRYSTALS MISCELLANEOUS AS NEEDED
Status: DISCONTINUED | OUTPATIENT
Start: 2019-03-08 | End: 2019-03-08 | Stop reason: SDUPTHER

## 2019-03-08 RX ORDER — IPRATROPIUM BROMIDE AND ALBUTEROL SULFATE 2.5; .5 MG/3ML; MG/3ML
SOLUTION RESPIRATORY (INHALATION)
Status: DISPENSED
Start: 2019-03-08 | End: 2019-03-09

## 2019-03-08 RX ORDER — HEPARIN SODIUM 1000 [USP'U]/ML
INJECTION, SOLUTION INTRAVENOUS; SUBCUTANEOUS AS NEEDED
Status: DISCONTINUED | OUTPATIENT
Start: 2019-03-08 | End: 2019-03-08 | Stop reason: HOSPADM

## 2019-03-08 RX ORDER — MIDAZOLAM HYDROCHLORIDE 1 MG/ML
1 INJECTION, SOLUTION INTRAMUSCULAR; INTRAVENOUS AS NEEDED
Status: DISCONTINUED | OUTPATIENT
Start: 2019-03-08 | End: 2019-03-08 | Stop reason: HOSPADM

## 2019-03-08 RX ORDER — PHENYLEPHRINE HCL IN 0.9% NACL 0.4MG/10ML
SYRINGE (ML) INTRAVENOUS AS NEEDED
Status: DISCONTINUED | OUTPATIENT
Start: 2019-03-08 | End: 2019-03-08 | Stop reason: HOSPADM

## 2019-03-08 RX ORDER — NALOXONE HYDROCHLORIDE 0.4 MG/ML
0.4 INJECTION, SOLUTION INTRAMUSCULAR; INTRAVENOUS; SUBCUTANEOUS AS NEEDED
Status: DISCONTINUED | OUTPATIENT
Start: 2019-03-08 | End: 2019-03-09 | Stop reason: HOSPADM

## 2019-03-08 RX ORDER — FUROSEMIDE 80 MG/1
80 TABLET ORAL 2 TIMES DAILY
Status: DISCONTINUED | OUTPATIENT
Start: 2019-03-08 | End: 2019-03-09 | Stop reason: HOSPADM

## 2019-03-08 RX ORDER — SODIUM CHLORIDE 0.9 % (FLUSH) 0.9 %
5-40 SYRINGE (ML) INJECTION AS NEEDED
Status: DISCONTINUED | OUTPATIENT
Start: 2019-03-08 | End: 2019-03-09 | Stop reason: HOSPADM

## 2019-03-08 RX ORDER — PANTOPRAZOLE SODIUM 40 MG/1
40 TABLET, DELAYED RELEASE ORAL
Status: DISCONTINUED | OUTPATIENT
Start: 2019-03-09 | End: 2019-03-09 | Stop reason: HOSPADM

## 2019-03-08 RX ORDER — MAGNESIUM SULFATE 100 %
4 CRYSTALS MISCELLANEOUS AS NEEDED
Status: DISCONTINUED | OUTPATIENT
Start: 2019-03-08 | End: 2019-03-09 | Stop reason: HOSPADM

## 2019-03-08 RX ORDER — KETAMINE HYDROCHLORIDE 10 MG/ML
INJECTION, SOLUTION INTRAMUSCULAR; INTRAVENOUS AS NEEDED
Status: DISCONTINUED | OUTPATIENT
Start: 2019-03-08 | End: 2019-03-08 | Stop reason: HOSPADM

## 2019-03-08 RX ORDER — MORPHINE SULFATE 10 MG/ML
2 INJECTION, SOLUTION INTRAMUSCULAR; INTRAVENOUS
Status: DISCONTINUED | OUTPATIENT
Start: 2019-03-08 | End: 2019-03-08 | Stop reason: HOSPADM

## 2019-03-08 RX ORDER — SODIUM CHLORIDE 0.9 % (FLUSH) 0.9 %
5-40 SYRINGE (ML) INJECTION EVERY 8 HOURS
Status: DISCONTINUED | OUTPATIENT
Start: 2019-03-08 | End: 2019-03-09 | Stop reason: HOSPADM

## 2019-03-08 RX ORDER — VANCOMYCIN/0.9 % SOD CHLORIDE 1.5G/250ML
1500 PLASTIC BAG, INJECTION (ML) INTRAVENOUS ONCE
Status: COMPLETED | OUTPATIENT
Start: 2019-03-08 | End: 2019-03-08

## 2019-03-08 RX ADMIN — CEFAZOLIN 3 G: 1 INJECTION, POWDER, FOR SOLUTION INTRAMUSCULAR; INTRAVENOUS; PARENTERAL at 08:10

## 2019-03-08 RX ADMIN — FUROSEMIDE 80 MG: 80 TABLET ORAL at 18:19

## 2019-03-08 RX ADMIN — HEPARIN SODIUM 2000 UNITS: 1000 INJECTION, SOLUTION INTRAVENOUS; SUBCUTANEOUS at 09:30

## 2019-03-08 RX ADMIN — GUAIFENESIN 200 MG: 200 SOLUTION ORAL at 16:54

## 2019-03-08 RX ADMIN — ACETAMINOPHEN 650 MG: 325 TABLET ORAL at 18:19

## 2019-03-08 RX ADMIN — FENTANYL CITRATE 25 MCG: 50 INJECTION, SOLUTION INTRAMUSCULAR; INTRAVENOUS at 11:17

## 2019-03-08 RX ADMIN — ROCURONIUM BROMIDE 20 MG: 10 INJECTION, SOLUTION INTRAVENOUS at 08:05

## 2019-03-08 RX ADMIN — ONDANSETRON 4 MG: 2 INJECTION INTRAMUSCULAR; INTRAVENOUS at 12:01

## 2019-03-08 RX ADMIN — VANCOMYCIN HYDROCHLORIDE 1500 MG: 10 INJECTION, POWDER, LYOPHILIZED, FOR SOLUTION INTRAVENOUS at 07:20

## 2019-03-08 RX ADMIN — KETAMINE HYDROCHLORIDE 25 MG: 10 INJECTION, SOLUTION INTRAMUSCULAR; INTRAVENOUS at 07:40

## 2019-03-08 RX ADMIN — PROTAMINE SULFATE 30 MG: 10 INJECTION, SOLUTION INTRAVENOUS at 11:26

## 2019-03-08 RX ADMIN — INSULIN LISPRO 5 UNITS: 100 INJECTION, SOLUTION INTRAVENOUS; SUBCUTANEOUS at 16:51

## 2019-03-08 RX ADMIN — HYDROCODONE BITARTRATE AND ACETAMINOPHEN 2 TABLET: 5; 325 TABLET ORAL at 21:43

## 2019-03-08 RX ADMIN — INSULIN GLARGINE 12 UNITS: 100 INJECTION, SOLUTION SUBCUTANEOUS at 21:44

## 2019-03-08 RX ADMIN — PROPOFOL 120 MG: 10 INJECTION, EMULSION INTRAVENOUS at 07:40

## 2019-03-08 RX ADMIN — Medication 10 ML: at 13:15

## 2019-03-08 RX ADMIN — FENTANYL CITRATE 50 MCG: 50 INJECTION, SOLUTION INTRAMUSCULAR; INTRAVENOUS at 09:03

## 2019-03-08 RX ADMIN — INSULIN LISPRO 4 UNITS: 100 INJECTION, SOLUTION INTRAVENOUS; SUBCUTANEOUS at 16:52

## 2019-03-08 RX ADMIN — GABAPENTIN 100 MG: 100 CAPSULE ORAL at 16:51

## 2019-03-08 RX ADMIN — IPRATROPIUM BROMIDE 1 PUFF: 17 AEROSOL, METERED RESPIRATORY (INHALATION) at 20:30

## 2019-03-08 RX ADMIN — GABAPENTIN 100 MG: 100 CAPSULE ORAL at 21:44

## 2019-03-08 RX ADMIN — PROTAMINE SULFATE 80 MG: 10 INJECTION, SOLUTION INTRAVENOUS at 11:12

## 2019-03-08 RX ADMIN — INSULIN LISPRO 2 UNITS: 100 INJECTION, SOLUTION INTRAVENOUS; SUBCUTANEOUS at 21:46

## 2019-03-08 RX ADMIN — FENTANYL CITRATE 100 MCG: 50 INJECTION, SOLUTION INTRAMUSCULAR; INTRAVENOUS at 07:40

## 2019-03-08 RX ADMIN — ROCURONIUM BROMIDE 20 MG: 10 INJECTION, SOLUTION INTRAVENOUS at 09:07

## 2019-03-08 RX ADMIN — Medication 10 ML: at 21:47

## 2019-03-08 RX ADMIN — HYDROCODONE BITARTRATE AND ACETAMINOPHEN 1 TABLET: 5; 325 TABLET ORAL at 16:54

## 2019-03-08 RX ADMIN — MIDAZOLAM HYDROCHLORIDE 1 MG: 1 INJECTION, SOLUTION INTRAMUSCULAR; INTRAVENOUS at 07:37

## 2019-03-08 RX ADMIN — Medication 40 MCG: at 09:18

## 2019-03-08 RX ADMIN — ALBUMIN HUMAN 250 ML: 50 SOLUTION INTRAVENOUS at 10:41

## 2019-03-08 RX ADMIN — SUCCINYLCHOLINE CHLORIDE 160 MG: 20 INJECTION INTRAMUSCULAR; INTRAVENOUS at 07:40

## 2019-03-08 RX ADMIN — HEPARIN SODIUM 6000 UNITS: 1000 INJECTION, SOLUTION INTRAVENOUS; SUBCUTANEOUS at 09:16

## 2019-03-08 RX ADMIN — CARVEDILOL 12.5 MG: 12.5 TABLET, FILM COATED ORAL at 16:51

## 2019-03-08 RX ADMIN — FENTANYL CITRATE 25 MCG: 50 INJECTION, SOLUTION INTRAMUSCULAR; INTRAVENOUS at 11:42

## 2019-03-08 RX ADMIN — LIDOCAINE HYDROCHLORIDE 40 MG: 20 INJECTION, SOLUTION EPIDURAL; INFILTRATION; INTRACAUDAL; PERINEURAL at 07:40

## 2019-03-08 RX ADMIN — INSULIN HUMAN 5 UNITS: 100 INJECTION, SOLUTION PARENTERAL at 07:20

## 2019-03-08 RX ADMIN — SODIUM CHLORIDE 25 ML/HR: 900 INJECTION, SOLUTION INTRAVENOUS at 06:45

## 2019-03-08 RX ADMIN — ATORVASTATIN CALCIUM 20 MG: 20 TABLET, FILM COATED ORAL at 21:44

## 2019-03-08 NOTE — PERIOP NOTES
TRANSFER - OUT REPORT:    Verbal report given to Zuly SANTIAGO(name) on Hossein Olmos  being transferred to [facility](unit) for routine post - op       Report consisted of patients Situation, Background, Assessment and   Recommendations(SBAR). Information from the following report(s) SBAR, OR Summary, Intake/Output, MAR, Recent Results and Cardiac Rhythm NSR was reviewed with the receiving nurse. Opportunity for questions and clarification was provided. Patient transported with:   Tech       Daughter notified of transfer by volunteerCarmnecita.

## 2019-03-08 NOTE — ANESTHESIA POSTPROCEDURE EVALUATION
Post-Anesthesia Evaluation and Assessment    Patient: Kellee Fair MRN: [id number]  SSN: [id number]    YOB: 1950  Age: 70 y.o. Sex: female      I have evaluated the patient and they are stable and ready for discharge from the PACU. Cardiovascular Function/Vital Signs  Visit Vitals  /52   Pulse 77   Temp 37.4 °C (99.3 °F)   Resp 9   Ht 5' 6\" (1.676 m)   Wt 135 kg (297 lb 9.9 oz)   SpO2 99%   BMI 48.04 kg/m²       Patient is status post General anesthesia for Procedure(s):  CREATION ARTERIO VENOUS FISTULA OR INSERTION OF AV GRAFT LEFT ARM. Nausea/Vomiting: None    Postoperative hydration reviewed and adequate. Pain:  Pain Scale 1: Numeric (0 - 10) (03/08/19 9317)  Pain Intensity 1: (\"A little bit) (03/08/19 1220)   Managed    Neurological Status:   Neuro (WDL): Exceptions to WDL (03/08/19 1212)  Neuro  Neurologic State: Lethargic;Eyes open to stimulus (03/08/19 1212)  Orientation Level: Unable to verbalize (03/08/19 1212)  Cognition: Decreased attention/concentration;Decreased command following (03/08/19 1212)  LUE Motor Response: Weak (03/08/19 0645)  LLE Motor Response: Weak (03/08/19 0645)  RUE Motor Response: Weak (03/08/19 0645)  RLE Motor Response: Weak (03/08/19 0645)   At baseline    Mental Status, Level of Consciousness: Alert and  oriented to person, place, and time    Pulmonary Status:   O2 Device: Nasal cannula (03/08/19 1220)   Adequate oxygenation and airway patent    Complications related to anesthesia: None    Post-anesthesia assessment completed. No concerns    Signed By: Madison Felix MD     March 8, 2019              Procedure(s):  CREATION ARTERIO VENOUS FISTULA OR INSERTION OF AV GRAFT LEFT ARM.     <BSHSIANPOST>    Visit Vitals  /52   Pulse 77   Temp 37.4 °C (99.3 °F)   Resp 9   Ht 5' 6\" (1.676 m)   Wt 135 kg (297 lb 9.9 oz)   SpO2 99%   BMI 48.04 kg/m²

## 2019-03-08 NOTE — PROGRESS NOTES
0630-Pt arrived via "[employer] transport company. Doing well, denies complaints. Information obtained by daughter Sherry Schmidt who is at bedside. 0645-Daughter stated pt's BS at home was 500, [facility] Will assess and notify Dr. Monica Mata.

## 2019-03-08 NOTE — H&P
Surgery History and Physical    Subjective:      Sofie Larose is a 70 y.o. female with ESRD, dialyzed by central catheter. She presents for surgery for hemodialysis catheter. Nephrologist  -  Dr Kamila Barnes, [address] [place] [address] [facility]    The patient had history of diabetes, CHF, pulmonary dysfunction. She uses O2 at night. She does not have history of anginal chest pain. She does have history of dyspnea with exertion and also dyspnea on lying down flat for extended times. Her blood sugar often runs high. Past Medical History:   Diagnosis Date    Arthritis     Chronic kidney disease     Kumar Garcia t-th-s    Congestive heart failure ([address]     Diabetes ([address]     GERD (gastroesophageal reflux disease)     Hypertension     Morbid obesity ([address]     Psychiatric disorder     depressed    Sleep apnea     CPAP     Past Surgical History:   Procedure Laterality Date    BREAST SURGERY PROCEDURE UNLISTED      R breast mass removed    HX CHOLECYSTECTOMY      HX HYSTERECTOMY      HX ORTHOPAEDIC      L big toe removed    HX ORTHOPAEDIC      R knee scope    HX OTHER SURGICAL      DIALYSIS CATHETER right neck    HX VASCULAR ACCESS Right     neck- dialysis      Family History   Problem Relation Age of Onset    Diabetes Father     Hypertension Father      Social History     Tobacco Use    Smoking status: Never Smoker    Smokeless tobacco: Never Used   Substance Use Topics    Alcohol use: No     Frequency: Never      Prior to Admission medications    Medication Sig Start Date End Date Taking? Authorizing Provider   insulin lispro protamine/insulin lispro (HUMALOG MIX 50-50 INSULN U-100) 100 unit/mL (50-50) injection 10 Units by SubCUTAneous route three (3) times daily. Sliding scale also   Yes Provider, Historical   insulin glargine (LANTUS) 100 unit/mL injection 12 Units by SubCUTAneous route nightly.  12/12/18  Yes Jevon Gillis MD   lansoprazole (PREVACID) 30 mg capsule Take 30 mg by mouth Daily (before breakfast). Yes Provider, Historical   sucroferric oxyhydroxide (VELPHORO) 500 mg chew chewable tablet Take  by mouth three (3) times daily (with meals). Yes Provider, Historical   acetaminophen (TYLENOL) 325 mg tablet Take 650 mg by mouth every four (4) hours as needed for Pain. Yes Provider, Historical   carvedilol (COREG) 6.25 mg tablet Take 12.5 mg by mouth two (2) times daily (with meals). Yes Provider, Historical   cloNIDine HCl (CATAPRES) 0.1 mg tablet Take  by mouth two (2) times a day. Yes Provider, Historical   ferrous sulfate 325 mg (65 mg iron) tablet Take  by mouth Daily (before breakfast). Yes Provider, Historical   furosemide (LASIX) 80 mg tablet Take  by mouth two (2) times a day. Yes Provider, Historical   gabapentin (NEURONTIN) 100 mg capsule Take  by mouth three (3) times daily. Yes Provider, Historical   hydrALAZINE (APRESOLINE) 100 mg tablet Take  by mouth two (2) times a day. Yes Provider, Historical   ipratropium (ATROVENT HFA) 17 mcg/actuation inhaler Take 1 Puff by inhalation. Yes Provider, Historical   HYDROcodone-acetaminophen (NORCO) 5-325 mg per tablet Take 1 Tab by mouth every eight (8) hours as needed for Pain. Max Daily Amount: 3 Tabs. 12/9/18   Oralia Guzmán MD   traMADol (ULTRAM) 50 mg tablet Take 50 mg by mouth every six (6) hours as needed for Pain. Provider, Historical   bisacodyl (DULCOLAX) 10 mg suppository Insert 10 mg into rectum daily. Provider, Historical   guaiFENesin (ROBITUSSIN) 100 mg/5 mL liquid Take 200 mg by mouth three (3) times daily as needed for Cough. Provider, Historical   albuterol (PROVENTIL VENTOLIN) 2.5 mg /3 mL (0.083 %) nebulizer solution by Nebulization route once. Provider, Historical   aspirin delayed-release 81 mg tablet Take  by mouth daily. Provider, Historical   rosuvastatin (CRESTOR) 10 mg tablet Take 10 mg by mouth nightly.     Provider, Historical      Allergies   Allergen Reactions    Sulfadiazine Other (comments)       Review of Systems:  Pertinent items are noted in the History of Present Illness. Objective:        Patient Vitals for the past 8 hrs:   BP Temp Pulse Resp SpO2 Height Weight   19 0639 125/62 99.3 °F (37.4 °C) 72 15 99 % 5' 6\" (1.676 m) 135 kg (297 lb 9.9 oz)       Temp (24hrs), Av.3 °F (37.4 °C), Min:99.3 °F (37.4 °C), Max:99.3 °F (37.4 °C)      Physical Exam:  WD obese elderly woman,  NAD  HEAD/NECK: No jaundice, mass or thyromegaly. Right central catheter. LUNGS: Clear bilaterally without wheeze, rhonchi or rales. Isaac Mcclusky HEART: Regular without murmur, gallop or rub. Abdomen:  Soft, non tender. NEURO: Patient is alert and oriented x 3 and moves all extremities equally. Facial movement is symmetrical. Speech was normal.   EXT: Palpable left radial pulse. Assessment:     ESRD    Plan:     Creation of arteriovenous fistula or insertion of arteriovenous graft left arm.     Signed By: Diamond Hedrick MD     2019

## 2019-03-08 NOTE — ANESTHESIA PREPROCEDURE EVALUATION
Anesthetic History   No history of anesthetic complications            Review of Systems / Medical History  Patient summary reviewed, nursing notes reviewed and pertinent labs reviewed    Pulmonary        Sleep apnea           Neuro/Psych         Psychiatric history     Cardiovascular    Hypertension              Exercise tolerance: >4 METS     GI/Hepatic/Renal     GERD    Renal disease: ESRD and dialysis       Endo/Other    Diabetes    Morbid obesity and arthritis     Other Findings              Physical Exam    Airway  Mallampati: III  TM Distance: 4 - 6 cm  Neck ROM: normal range of motion   Mouth opening: Normal     Cardiovascular    Rhythm: regular  Rate: normal         Dental    Dentition: Full upper dentures and Poor dentition     Pulmonary  Breath sounds clear to auscultation               Abdominal  GI exam deferred       Other Findings            Anesthetic Plan    ASA: 4  Anesthesia type: general          Induction: Intravenous  Anesthetic plan and risks discussed with: Patient

## 2019-03-08 NOTE — PERIOP NOTES
Dr. Nas Esparza notified of FSBS = 200. No new orders at present. Hospitalist, Dr. Glenn Hernandez notified of consult by Dr. Link Prasad for diabetic management.

## 2019-03-08 NOTE — PERIOP NOTES
Handoff Report from Operating Room to PACU    Report received from ANITA Taylor RN and Cristine Abraham CRNA regarding Yuriy Everett. Surgeon(s):  Gurwinder Pompa MD  And Procedure(s) (LRB):  CREATION ARTERIO VENOUS FISTULA OR INSERTION OF AV GRAFT LEFT ARM (Left)  confirmed   with allergies and dressings discussed. Anesthesia type, drugs, patient history, complications, estimated blood loss, vital signs, intake and output, and last pain medication, lines and temperature were reviewed.

## 2019-03-08 NOTE — CONSULTS
Consultation Note    NAME: Yuriy Pa   :  1950   MRN:  [id number]     Date/Time:  3/8/2019 2:37 PM    I have been asked to see this patient by Dr. Steven Young  for advice/opinion re: ESRD. Assessment :    Plan:  ESRD  HTN  DM  Creation of left AVF  Perm cath (right IJ)   TTS HD at [address] [place] [address] (Dr Corrine Barnes)    HD tomorrow (orders done; Vijay Campbell is aware)           Subjective:   CHIEF COMPLAINT:  esrd    HISTORY OF PRESENT ILLNESS:     Tk Hayden is a 70 y.o.   female who has a history of the below. Ms. Madalyn Simpson is s/p access surgery today. She gets TTS hd. Currently awake, alert. Some nausea. No pain. No sob. Past Medical History:   Diagnosis Date    Arthritis     Chronic kidney disease     Kumar Garcia t-th-s    Congestive heart failure ([address]     Diabetes ([address]     GERD (gastroesophageal reflux disease)     Hypertension     Morbid obesity ([address]     Psychiatric disorder     depressed    Sleep apnea     CPAP      Past Surgical History:   Procedure Laterality Date    BREAST SURGERY PROCEDURE UNLISTED      R breast mass removed    HX CHOLECYSTECTOMY      HX HYSTERECTOMY      HX ORTHOPAEDIC      L big toe removed    HX ORTHOPAEDIC      R knee scope    HX OTHER SURGICAL      DIALYSIS CATHETER right neck    HX VASCULAR ACCESS Right     neck- dialysis     Social History     Tobacco Use    Smoking status: Never Smoker    Smokeless tobacco: Never Used   Substance Use Topics    Alcohol use: No     Frequency: Never      Family History   Problem Relation Age of Onset    Diabetes Father     Hypertension Father       Allergies   Allergen Reactions    Sulfadiazine Other (comments)      Prior to Admission medications    Medication Sig Start Date End Date Taking? Authorizing Provider   insulin lispro protamine/insulin lispro (HUMALOG MIX 50-50 INSULN U-100) 100 unit/mL (50-50) injection 10 Units by SubCUTAneous route three (3) times daily.  Sliding scale also   Yes Provider, Historical   insulin glargine (LANTUS) 100 unit/mL injection 12 Units by SubCUTAneous route nightly. 12/12/18  Yes Matt Zazueta MD   lansoprazole (PREVACID) 30 mg capsule Take 30 mg by mouth Daily (before breakfast). Yes Provider, Historical   sucroferric oxyhydroxide (VELPHORO) 500 mg chew chewable tablet Take  by mouth three (3) times daily (with meals). Yes Provider, Historical   acetaminophen (TYLENOL) 325 mg tablet Take 650 mg by mouth every four (4) hours as needed for Pain. Yes Provider, Historical   carvedilol (COREG) 6.25 mg tablet Take 12.5 mg by mouth two (2) times daily (with meals). Yes Provider, Historical   cloNIDine HCl (CATAPRES) 0.1 mg tablet Take  by mouth two (2) times a day. Yes Provider, Historical   ferrous sulfate 325 mg (65 mg iron) tablet Take  by mouth Daily (before breakfast). Yes Provider, Historical   furosemide (LASIX) 80 mg tablet Take  by mouth two (2) times a day. Yes Provider, Historical   gabapentin (NEURONTIN) 100 mg capsule Take  by mouth three (3) times daily. Yes Provider, Historical   hydrALAZINE (APRESOLINE) 100 mg tablet Take  by mouth two (2) times a day. Yes Provider, Historical   ipratropium (ATROVENT HFA) 17 mcg/actuation inhaler Take 1 Puff by inhalation. Yes Provider, Historical   HYDROcodone-acetaminophen (NORCO) 5-325 mg per tablet Take 1 Tab by mouth every eight (8) hours as needed for Pain. Max Daily Amount: 3 Tabs. 12/9/18   Skip Ardon MD   traMADol (ULTRAM) 50 mg tablet Take 50 mg by mouth every six (6) hours as needed for Pain. Provider, Historical   bisacodyl (DULCOLAX) 10 mg suppository Insert 10 mg into rectum daily. Provider, Historical   guaiFENesin (ROBITUSSIN) 100 mg/5 mL liquid Take 200 mg by mouth three (3) times daily as needed for Cough. Provider, Historical   albuterol (PROVENTIL VENTOLIN) 2.5 mg /3 mL (0.083 %) nebulizer solution by Nebulization route once.     Provider, Historical   aspirin delayed-release 81 mg tablet Take  by mouth daily. Provider, Historical   rosuvastatin (CRESTOR) 10 mg tablet Take 10 mg by mouth nightly.     Provider, Historical     REVIEW OF SYSTEMS:     []  Unable to obtain reliable ROS due to  [] mental status  [] sedated   [] intubated   [x] Total of 12 systems reviewed as follows:  Constitutional: negative fever, negative chills, negative weight loss  Eyes:   negative visual changes  ENT:   negative sore throat, tongue or lip swelling  Respiratory:  negative cough, negative dyspnea  Cards:  negative for chest pain, palpitations, lower extremity edema  GI:   negative for  vomiting, diarrhea, and abdominal pain  :  negative for frequency, dysuria  Integument:  negative for rash and pruritus  Heme:  negative for easy bruising and gum/nose bleeding  Musculoskel: negative for myalgias,  back pain and muscle weakness  Neuro:  negative for headaches, dizziness, vertigo  Psych:  negative for feelings of anxiety, depression   Travel?: none    Objective:   VITALS:    Visit Vitals  /59 (BP 1 Location: Right arm)   Pulse 82   Temp 98.2 °F (36.8 °C)   Resp 16   Ht 5' 6\" (1.676 m)   Wt 135 kg (297 lb 9.9 oz)   SpO2 99%   BMI 48.04 kg/m²     PHYSICAL EXAM:  Gen:  []  WD []  WN  [] cachectic []  thin [x]  obese []  disheveled             []  ill apearing  []   Critical  []   Chronic    [x]  No acute distress    HEENT:   [x] NC/AT/PERRLA/EOMI    [] pink conjunctivae      [] pale conjunctivae                  PERRL  [] yes  [] no      [] moist mucosa    [] dry mucosa    hearing intact to voice [x] yes  [] No                 NECK:   supple [x] yes  [] no        masses [] yes  [] No               thyroid  []  non tender  []  tender    RESP:   [x] CTA bilaterally/no wheezing/rhonchi/rales/crackles    [] rhonchi bilaterally - no dullness  [] wheezing   [] rhonchi   [] crackles     use of accessory muscles [] yes [] no    CARD:   [x]  regular rate and rhythm/No murmurs/rubs/gallops    murmur  [] yes ()  [] no      Rubs  [] yes  [] no       Gallops [] yes  [] no    Rate []  regular  []  irregular        carotid bruits  [] Right  []  Left                 LE edema [] yes  [x] no           JVP  []  yes   []  no    ABD:    [x] soft/non distended/non tender/+bowel sounds/no HSM    []  Rigid    tenderness [] yes [] no   Liver enlargement  []  yes []  no                Spleen enlargement  []  yes []  no     distended []  yes [] no     bowel sound  [] hypoactive   [] hyperactive    LYMPH:    Neck []  yes []  no       Axillae []  yes []  no    SKIN:   Rashes []  yes   [x]  no    Ulcers []  yes   []  no               [] tight to palpitation    skin turgor []  good  [] poor  [] decreased               Cyanosis/clubbing []  yes []  no    NEUR:   [x] cranial nerves II-XII grossly intact       [] Cranial nerves deficit                 []  facial droop    []  slurred speech   [] aphasic     [] Strength normal     []  weakness  []  LUE  []   RUE/ []  LLE  []   RLE    follows commands  [x]  yes []  no           PSYCH:   insight [] poor [x] good   Alert and Oriented to  [x] person  [x] place  [x]  time                    [] depressed [] anxious [] agitated  [] lethargic [] stuporous  [] sedated     LAB DATA REVIEWED:    No results for input(s): WBC, HGB, HCT, PLT, HGBEXT, HCTEXT, PLTEXT in the last 72 hours. No results for input(s): NA, K, CL, CO2, BUN, CREA, GLU, CA, MG, PHOS, URICA in the last 72 hours. No results for input(s): SGOT, GPT, ALT, AP, TBIL, TBILI, ALB, GLOB, GGT, AML, LPSE in the last 72 hours. No lab exists for component: AMYP, HLPSE  No results for input(s): INR, PTP, APTT in the last 72 hours. No lab exists for component: INREXT   No results for input(s): FE, TIBC, PSAT, FERR in the last 72 hours. No results for input(s): PH, PCO2, PO2 in the last 72 hours. No results for input(s): CPK, CKMB in the last 72 hours.     No lab exists for component: TROPONINI  Lab Results   Component Value Date/Time    Glucose (POC) 200 (H) 03/08/2019 12:24 PM    Glucose (POC) 151 (H) 03/08/2019 08:26 AM    Glucose (POC) 286 (H) 03/08/2019 06:56 AM    Glucose (POC) 309 (H) 12/12/2018 11:10 AM    Glucose (POC) 326 (H) 12/12/2018 11:08 AM    Glucose (POC) 417 (H) 12/12/2018 08:09 AM       Procedures: see electronic medical records for all procedures/Xrays and details which were not copied into this note but were reviewed prior to creation of Plan.    ________________________________________________________________________       ___________________________________________________  Consulting Physician:  Vaibhav Rodriguez MD

## 2019-03-08 NOTE — BRIEF OP NOTE
BRIEF OPERATIVE NOTE    Date of Procedure: 3/8/2019   Preoperative Diagnosis: END STAGE RENAL DISEASE  Postoperative Diagnosis: END STAGE RENAL DISEASE    Procedure(s):   INSERTION OF AV GRAFT LEFT ARM (PTFE)  Surgeon(s) and Role:     Rosa Isela Ortega MD - Primary         Surgical Assistant: staff    Surgical Staff:  Circ-1: Jaxson Ngo RN  Scrub Tech-1: Deepthi Wolf  Scrub Tech-Relief: Alexandra Ceja  Surg Asst-1: Julita Carmen  Event Time In Time Out   Incision Start 0818    Incision Close       Anesthesia: General   Estimated Blood Loss: 150 ml  Specimens: * No specimens in log *   Findings: Palpable thrill in AV graft and palpable pulse in radial artery at the wrist post op. Heparin reversed with protamine. Oozing from tunnel distal medial aspect of arm treated with Surgicel. Complications: none  Implants:   Implant Name Type Inv.  Item Serial No.  Lot No. LRB No. Used Action   GRAFT TW STRTCH [id number] -- Irasema César  GRAFT TW STRTCH [id number] -- Janes Loera [id number]  [employer] NA Left 1 Implanted

## 2019-03-08 NOTE — CONSULTS
Hospitalist Consultation Note    NAME:  Song Ríos   :   1950   MRN:   [id number]     ATTENDING: Fausto Haines MD  PCP:  Veronica Tomas MD    Date/Time:  3/8/2019 12:52 PM      Recommendations/Plan:     T2DM with neuropathy  -last A1C in 2018 was 12.4, will repeat A1C with AM labs  -takes lantus and humalog at home per pt, will ask pharm to help with medrec  -BG elevated on admission between 200-300s. Pt reports taking lantus last night prior to bedtime. Her daughter manages her medicine  -will resume lantus qhs, add lispro 5 units TID premeals, titrate prn  -add SSI  -cont' gabapentin  -pharm to help with med rec    HTN  -cont' clonidine, coreg, lasix, hydralazine  -prn nitrobid    HLD  -cont' statin    ESRD   -s/p AV graft on L arm by Dr Betty Novoa  -HD on TTS  -nephrology consultation appreciated    JAZMYN  -on CPAP and prn oxygen at home    Attempted to find pt's daughter Allison Young in the waiting room however she was not present. Called number listed on facesheet, pt also not available. Thank you for the consult. Dr Jarold Sicard will resume care on 3/9/19          Subjective:   REQUESTING PHYSICIAN: Dr Sandra Londono:    Management of T2DM  Stephanie Naylor is a 70 y.o.  female who I was asked to see for management of DM. Pt with pmhx significant for T2DM, ESRD, HTN, anemia, admitted for AV graft on L arm. Pt was seen and examined post op. Patient was mildly sedated during my encounter, was only able to answer some questions appropriately as she goes in and out of sleep. Pt states she lives with her daughter and medications are normally managed by daughter. She reports taking her lantus overnight prior to going to bed. She notes taking humalog and lantus as her regular insulin regimen but does not recall dose. She denies any complaints other than feeling hot (warm blanket removed). Vitals/labs reviewed.     Past Medical History:   Diagnosis Date  Arthritis     Chronic kidney disease     [facility] t-th-s    Cong[facility] failure [facility]     Diabetes ([facility]     GERD (gastroesophageal reflux disease)     Hypertension     Morbid obesity ([facility]     Psychiatric disorder     depressed    Sleep apnea     CPAP      Past Surgical History:   Procedure Laterality Date    BREAST SURGERY PROCEDURE UNLISTED      R breast mass removed    HX CHOLECYSTECTOMY      HX HYSTERECTOMY      HX ORTHOPAEDIC      L big toe removed    HX ORTHOPAEDIC      R knee scope    HX OTHER SURGICAL      DIALYSIS CATHETER right neck    HX VASCULAR ACCESS Right     neck- dialysis     Social History     Tobacco Use    Smoking status: Never Smoker    Smokeless tobacco: Never Used   Substance Use Topics    Alcohol use: No     Frequency: Never      Family History   Problem Relation Age of Onset    Diabetes Father     Hypertension Father        Allergies   Allergen Reactions    Sulfadiazine Other (comments)      Prior to Admission medications    Medication Sig Start Date End Date Taking? Authorizing Provider   insulin lispro protamine/insulin lispro (HUMALOG MIX 50-50 INSULN U-100) 100 unit/mL (50-50) injection 10 Units by SubCUTAneous route three (3) times daily. Sliding scale also   Yes Provider, Historical   insulin glargine (LANTUS) 100 unit/mL injection 12 Units by SubCUTAneous route nightly. 12/12/18  Yes Jahaira Godinez MD   lansoprazole (PREVACID) 30 mg capsule Take 30 mg by mouth Daily (before breakfast). Yes Provider, Historical   sucroferric oxyhydroxide (VELPHORO) 500 mg chew chewable tablet Take  by mouth three (3) times daily (with meals). Yes Provider, Historical   acetaminophen (TYLENOL) 325 mg tablet Take 650 mg by mouth every four (4) hours as needed for Pain. Yes Provider, Historical   carvedilol (COREG) 6.25 mg tablet Take 12.5 mg by mouth two (2) times daily (with meals).    Yes Provider, Historical   cloNIDine HCl (CATAPRES) 0.1 mg tablet Take  by mouth two (2) times a day. Yes Provider, Historical   ferrous sulfate 325 mg (65 mg iron) tablet Take  by mouth Daily (before breakfast). Yes Provider, Historical   furosemide (LASIX) 80 mg tablet Take  by mouth two (2) times a day. Yes Provider, Historical   gabapentin (NEURONTIN) 100 mg capsule Take  by mouth three (3) times daily. Yes Provider, Historical   hydrALAZINE (APRESOLINE) 100 mg tablet Take  by mouth two (2) times a day. Yes Provider, Historical   ipratropium (ATROVENT HFA) 17 mcg/actuation inhaler Take 1 Puff by inhalation. Yes Provider, Historical   HYDROcodone-acetaminophen (NORCO) 5-325 mg per tablet Take 1 Tab by mouth every eight (8) hours as needed for Pain. Max Daily Amount: 3 Tabs. 12/9/18   Jason Escalona MD   traMADol (ULTRAM) 50 mg tablet Take 50 mg by mouth every six (6) hours as needed for Pain. Provider, Historical   bisacodyl (DULCOLAX) 10 mg suppository Insert 10 mg into rectum daily. Provider, Historical   guaiFENesin (ROBITUSSIN) 100 mg/5 mL liquid Take 200 mg by mouth three (3) times daily as needed for Cough. Provider, Historical   albuterol (PROVENTIL VENTOLIN) 2.5 mg /3 mL (0.083 %) nebulizer solution by Nebulization route once. Provider, Historical   aspirin delayed-release 81 mg tablet Take  by mouth daily. Provider, Historical   rosuvastatin (CRESTOR) 10 mg tablet Take 10 mg by mouth nightly. Provider, Historical       REVIEW OF SYSTEMS:     Total of 12 systems reviewed as follows:   I am not able to complete the review of systems because:    The patient is intubated and sedated    The patient has altered mental status due to his acute medical problems    The patient has baseline aphasia from prior stroke(s)    The patient has baseline dementia and is not reliable historian                 POSITIVE= underlined text  Negative = text not underlined  General:  fever, chills, hot, sweats, generalized weakness, weight loss/gain,      loss of appetite Eyes:    blurred vision, eye pain, loss of vision, double vision  ENT:    rhinorrhea, pharyngitis   Respiratory:   cough, sputum production, SOB, wheezing, LARSEN, pleuritic pain   Cardiology:   chest pain, palpitations, orthopnea, PND, edema, syncope   Gastrointestinal:  abdominal pain , N/V, dysphagia, diarrhea, constipation, bleeding   Genitourinary:  frequency, urgency, dysuria, hematuria, incontinence   Muskuloskeletal :  arthralgia, myalgia   Hematology:  easy bruising, nose or gum bleeding, lymphadenopathy   Dermatological: rash, ulceration, pruritis   Endocrine:   hot flashes or polydipsia   Neurological:  headache, dizziness, confusion, focal weakness, paresthesia,     Speech difficulties, memory loss, gait disturbance  Psychological: Feelings of anxiety, depression, agitation    Objective:   VITALS:    Visit Vitals  /55   Pulse 83   Temp 99.3 °F (37.4 °C)   Resp 10   Ht 5' 6\" (1.676 m)   Wt 135 kg (297 lb 9.9 oz)   SpO2 100%   BMI 48.04 kg/m²     Temp (24hrs), Av.3 °F (37.4 °C), Min:99.3 °F (37.4 °C), Max:99.3 °F (37.4 °C)      PHYSICAL EXAM:   General:    Alert, cooperative, no distress, appears stated age. HEENT: Atraumatic, anicteric sclerae, pink conjunctivae     No oral ulcers, mucosa moist, throat clear  Neck:  Supple, symmetrical,  thyroid: non tender  Lungs:   Clear to auscultation bilaterally. No Wheezing or Rhonchi. No rales. Chest wall:  No tenderness  No Accessory muscle use. Heart:   Regular  rhythm,  No  murmur   No edema  Abdomen:   Soft, non-tender. Not distended. Bowel sounds normal  Extremities: No cyanosis. No clubbing  Skin:     Not pale. Not Jaundiced  No rashes   Psych:  Good insight. Not depressed. Not anxious or agitated.   Neurologic: Mildly sedated but was able to follow some commands    _______________________________________________________________________  Care Plan discussed with:    Comments   Patient x    Family      RN x    Care Manager Consultant:      ____________________________________________________________________  TOTAL TIME:     65 mins    Comments    x Reviewed previous records   >50% of visit spent in counseling and coordination of care x Discussion with patient and/or family and questions answered       Critical Care Provided     Minutes non procedure based  ________________________________________________________________________  Signed: Jazmine Walls MD      Procedures: see electronic medical records for all procedures/Xrays and details which were not copied into this note but were reviewed prior to creation of Plan. LAB DATA REVIEWED:    Recent Results (from the past 24 hour(s))   POC CHEM8    Collection Time: 03/08/19  6:56 AM   Result Value Ref Range    Calcium, ionized (POC) 1.15 1.12 - 1.32 mmol/L    Sodium (POC) 134 (L) 136 - 145 mmol/L    Potassium (POC) 4.6 3.5 - 5.1 mmol/L    Chloride (POC) 97 (L) 98 - 107 mmol/L    CO2 (POC) 29 21 - 32 mmol/L    Anion gap (POC) 14 10 - 20 mmol/L    Glucose (POC) 286 (H) 65 - 100 mg/dL    BUN (POC) 36 (H) 9 - 20 mg/dL    Creatinine (POC) 4.6 (H) 0.6 - 1.3 mg/dL    GFRAA, POC 11 (L) >60 ml/min/1.73m2    GFRNA, POC 9 (L) >60 ml/min/1.73m2    Hematocrit (POC) 38 35.0 - 47.0 %    Comment Comment Not Indicated.      GLUCOSE, POC    Collection Time: 03/08/19  8:26 AM   Result Value Ref Range    Glucose (POC) 151 (H) 65 - 100 mg/dL    Performed by Arnaud Nieves(Deb)    GLUCOSE, POC    Collection Time: 03/08/19 12:24 PM   Result Value Ref Range    Glucose (POC) 200 (H) 65 - 100 mg/dL    Performed by Raoul Portal        _____________________________  Hospitalist: Jazmine Walls MD

## 2019-03-09 VITALS
OXYGEN SATURATION: 100 % | HEART RATE: 89 BPM | RESPIRATION RATE: 18 BRPM | SYSTOLIC BLOOD PRESSURE: 102 MMHG | WEIGHT: 293 LBS | TEMPERATURE: 98 F | BODY MASS INDEX: 47.09 KG/M2 | HEIGHT: 66 IN | DIASTOLIC BLOOD PRESSURE: 59 MMHG

## 2019-03-09 LAB
ANION GAP SERPL CALC-SCNC: 12 MMOL/L (ref 5–15)
BASOPHILS # BLD: 0.1 K/UL (ref 0–0.1)
BASOPHILS NFR BLD: 1 % (ref 0–1)
BUN SERPL-MCNC: 45 MG/DL (ref 6–20)
BUN/CREAT SERPL: 8 (ref 12–20)
CALCIUM SERPL-MCNC: 7.5 MG/DL (ref 8.5–10.1)
CHLORIDE SERPL-SCNC: 97 MMOL/L (ref 97–108)
CO2 SERPL-SCNC: 22 MMOL/L (ref 21–32)
CREAT SERPL-MCNC: 5.95 MG/DL (ref 0.55–1.02)
DIFFERENTIAL METHOD BLD: ABNORMAL
EOSINOPHIL # BLD: 0.2 K/UL (ref 0–0.4)
EOSINOPHIL NFR BLD: 3 % (ref 0–7)
ERYTHROCYTE [DISTWIDTH] IN BLOOD BY AUTOMATED COUNT: 15.5 % (ref 11.5–14.5)
EST. AVERAGE GLUCOSE BLD GHB EST-MCNC: 243 MG/DL
GLUCOSE BLD STRIP.AUTO-MCNC: 163 MG/DL (ref 65–100)
GLUCOSE BLD STRIP.AUTO-MCNC: 209 MG/DL (ref 65–100)
GLUCOSE BLD STRIP.AUTO-MCNC: 414 MG/DL (ref 65–100)
GLUCOSE SERPL-MCNC: 412 MG/DL (ref 65–100)
HBA1C MFR BLD: 10.1 % (ref 4.2–6.3)
HCT VFR BLD AUTO: 32.1 % (ref 35–47)
HGB BLD-MCNC: 10.1 G/DL (ref 11.5–16)
IMM GRANULOCYTES # BLD AUTO: 0 K/UL (ref 0–0.04)
IMM GRANULOCYTES NFR BLD AUTO: 0 % (ref 0–0.5)
LYMPHOCYTES # BLD: 1.3 K/UL (ref 0.8–3.5)
LYMPHOCYTES NFR BLD: 16 % (ref 12–49)
MCH RBC QN AUTO: 30.5 PG (ref 26–34)
MCHC RBC AUTO-ENTMCNC: 31.5 G/DL (ref 30–36.5)
MCV RBC AUTO: 97 FL (ref 80–99)
MONOCYTES # BLD: 0.6 K/UL (ref 0–1)
MONOCYTES NFR BLD: 8 % (ref 5–13)
NEUTS SEG # BLD: 5.7 K/UL (ref 1.8–8)
NEUTS SEG NFR BLD: 72 % (ref 32–75)
NRBC # BLD: 0 K/UL (ref 0–0.01)
NRBC BLD-RTO: 0 PER 100 WBC
PLATELET # BLD AUTO: 169 K/UL (ref 150–400)
PMV BLD AUTO: 11.5 FL (ref 8.9–12.9)
POTASSIUM SERPL-SCNC: 5.3 MMOL/L (ref 3.5–5.1)
RBC # BLD AUTO: 3.31 M/UL (ref 3.8–5.2)
RBC MORPH BLD: ABNORMAL
SERVICE CMNT-IMP: ABNORMAL
SODIUM SERPL-SCNC: 131 MMOL/L (ref 136–145)
WBC # BLD AUTO: 7.9 K/UL (ref 3.6–11)

## 2019-03-09 PROCEDURE — 36415 COLL VENOUS BLD VENIPUNCTURE: CPT

## 2019-03-09 PROCEDURE — 90935 HEMODIALYSIS ONE EVALUATION: CPT

## 2019-03-09 PROCEDURE — 94760 N-INVAS EAR/PLS OXIMETRY 1: CPT

## 2019-03-09 PROCEDURE — 74011636637 HC RX REV CODE- 636/637: Performed by: INTERNAL MEDICINE

## 2019-03-09 PROCEDURE — 74011636637 HC RX REV CODE- 636/637: Performed by: HOSPITALIST

## 2019-03-09 PROCEDURE — 85025 COMPLETE CBC W/AUTO DIFF WBC: CPT

## 2019-03-09 PROCEDURE — 74011250637 HC RX REV CODE- 250/637: Performed by: SURGERY

## 2019-03-09 PROCEDURE — 82962 GLUCOSE BLOOD TEST: CPT

## 2019-03-09 PROCEDURE — 99218 HC RM OBSERVATION: CPT

## 2019-03-09 PROCEDURE — 83036 HEMOGLOBIN GLYCOSYLATED A1C: CPT

## 2019-03-09 PROCEDURE — 80048 BASIC METABOLIC PNL TOTAL CA: CPT

## 2019-03-09 PROCEDURE — G0378 HOSPITAL OBSERVATION PER HR: HCPCS

## 2019-03-09 RX ORDER — INSULIN LISPRO 100 [IU]/ML
10 INJECTION, SOLUTION INTRAVENOUS; SUBCUTANEOUS
Status: DISCONTINUED | OUTPATIENT
Start: 2019-03-09 | End: 2019-03-09 | Stop reason: HOSPADM

## 2019-03-09 RX ORDER — HYDROCODONE BITARTRATE AND ACETAMINOPHEN 5; 325 MG/1; MG/1
1 TABLET ORAL
Qty: 12 TAB | Refills: 0 | Status: SHIPPED | OUTPATIENT
Start: 2019-03-09 | End: 2019-03-14

## 2019-03-09 RX ADMIN — ACETAMINOPHEN 650 MG: 325 TABLET ORAL at 18:14

## 2019-03-09 RX ADMIN — INSULIN LISPRO 10 UNITS: 100 INJECTION, SOLUTION INTRAVENOUS; SUBCUTANEOUS at 17:49

## 2019-03-09 RX ADMIN — GABAPENTIN 100 MG: 100 CAPSULE ORAL at 17:49

## 2019-03-09 RX ADMIN — INSULIN LISPRO 2 UNITS: 100 INJECTION, SOLUTION INTRAVENOUS; SUBCUTANEOUS at 17:49

## 2019-03-09 RX ADMIN — ACETAMINOPHEN 650 MG: 325 TABLET ORAL at 06:34

## 2019-03-09 RX ADMIN — INSULIN LISPRO 5 UNITS: 100 INJECTION, SOLUTION INTRAVENOUS; SUBCUTANEOUS at 08:07

## 2019-03-09 RX ADMIN — INSULIN LISPRO 10 UNITS: 100 INJECTION, SOLUTION INTRAVENOUS; SUBCUTANEOUS at 14:09

## 2019-03-09 RX ADMIN — PANTOPRAZOLE SODIUM 40 MG: 40 TABLET, DELAYED RELEASE ORAL at 06:33

## 2019-03-09 RX ADMIN — HYDROCODONE BITARTRATE AND ACETAMINOPHEN 1 TABLET: 5; 325 TABLET ORAL at 08:10

## 2019-03-09 RX ADMIN — INSULIN HUMAN 7 UNITS: 100 INJECTION, SUSPENSION SUBCUTANEOUS at 09:47

## 2019-03-09 RX ADMIN — IPRATROPIUM BROMIDE 1 PUFF: 17 AEROSOL, METERED RESPIRATORY (INHALATION) at 09:47

## 2019-03-09 RX ADMIN — FUROSEMIDE 80 MG: 80 TABLET ORAL at 08:11

## 2019-03-09 RX ADMIN — Medication 10 ML: at 06:32

## 2019-03-09 RX ADMIN — IPRATROPIUM BROMIDE 1 PUFF: 17 AEROSOL, METERED RESPIRATORY (INHALATION) at 14:09

## 2019-03-09 RX ADMIN — FERROUS SULFATE TAB 325 MG (65 MG ELEMENTAL FE) 325 MG: 325 (65 FE) TAB at 06:32

## 2019-03-09 RX ADMIN — HYDROCODONE BITARTRATE AND ACETAMINOPHEN 2 TABLET: 5; 325 TABLET ORAL at 14:19

## 2019-03-09 RX ADMIN — GABAPENTIN 100 MG: 100 CAPSULE ORAL at 08:10

## 2019-03-09 NOTE — PROGRESS NOTES
Still awaiting [facility] transport originally scheduled for 1500. Second call to [facility] [place] for update: will be here in 20 minutes. Discharge instructions and meds discussed with pt and daughter, they will do evenign BP meds at home after checking BP. LUE dressing CDI.     1820 Assisted pt to dress and change incontinence brief, gisselle care provided with assistance from daughter. Assisted to stretcher and discharged with [facility] [place].

## 2019-03-09 NOTE — DISCHARGE SUMMARY
Physician Discharge Summary     Patient ID:  Jaqueline Gamino  [id number]  89 y.o.  1950    Admit Date: 3/8/2019    Discharge Date: 3/9/2019    Admission Diagnoses: ESRD (end stage renal disease) ([facility] [N18.6]    Discharge Diagnoses: Active Problems:    ESRD (end stage renal disease) ([facility] (3/8/2019)         Admission Condition: Good    Discharge Condition: Good    Last Procedure: Procedure(s):  CREATION ARTERIO VENOUS FISTULA OR INSERTION OF AV GRAFT LEFT ARM      Hospital Course:   Normal hospital course for this procedure. Consults: None    Disposition: home    Patient Instructions:   Current Discharge Medication List      CONTINUE these medications which have CHANGED    Details   HYDROcodone-acetaminophen (NORCO) 5-325 mg per tablet Take 1 Tab by mouth every eight (8) hours as needed for Pain for up to 5 days. Max Daily Amount: 3 Tabs. Qty: 12 Tab, Refills: 0    Associated Diagnoses: CKD (chronic kidney disease) stage V requiring chronic dialysis ([place] [address].)         CONTINUE these medications which have NOT CHANGED    Details   insulin lispro protamine/insulin lispro (HUMALOG MIX 50-50 INSULN U-100) 100 unit/mL (50-50) injection 10 Units by SubCUTAneous route three (3) times daily. Sliding scale also      insulin glargine (LANTUS) 100 unit/mL injection 12 Units by SubCUTAneous route nightly. Qty: 1 Vial      lansoprazole (PREVACID) 30 mg capsule Take 30 mg by mouth Daily (before breakfast). sucroferric oxyhydroxide (VELPHORO) 500 mg chew chewable tablet Take  by mouth three (3) times daily (with meals). acetaminophen (TYLENOL) 325 mg tablet Take 650 mg by mouth every four (4) hours as needed for Pain. carvedilol (COREG) 6.25 mg tablet Take 12.5 mg by mouth two (2) times daily (with meals). cloNIDine HCl (CATAPRES) 0.1 mg tablet Take  by mouth two (2) times a day. ferrous sulfate 325 mg (65 mg iron) tablet Take  by mouth Daily (before breakfast).       furosemide (LASIX) 80 mg tablet Take  by mouth two (2) times a day.      gabapentin (NEURONTIN) 100 mg capsule Take  by mouth three (3) times daily. hydrALAZINE (APRESOLINE) 100 mg tablet Take  by mouth two (2) times a day. ipratropium (ATROVENT HFA) 17 mcg/actuation inhaler Take 1 Puff by inhalation. traMADol (ULTRAM) 50 mg tablet Take 50 mg by mouth every six (6) hours as needed for Pain. bisacodyl (DULCOLAX) 10 mg suppository Insert 10 mg into rectum daily. guaiFENesin (ROBITUSSIN) 100 mg/5 mL liquid Take 200 mg by mouth three (3) times daily as needed for Cough. albuterol (PROVENTIL VENTOLIN) 2.5 mg /3 mL (0.083 %) nebulizer solution by Nebulization route once. aspirin delayed-release 81 mg tablet Take  by mouth daily. rosuvastatin (CRESTOR) 10 mg tablet Take 10 mg by mouth nightly. Activity: No driving while on analgesics and No heavy lifting for 4 weeks  Diet: Regular Diet  Wound Care: Keep wound clean and dry    Follow-up with Dr. Mikaela Patton in 2 weeks.   Follow-up tests/labs none    Signed:  Zeke Medina MD  [facility]  3/9/2019  12:56 PM

## 2019-03-09 NOTE — PROCEDURES
[facility] D[facility] Team [facility]  [phone]    Vitals   Pre   Post   Assessment   Pre   Post     Temp  Temp: 98.3 °F (36.8 °C) (03/09/19 0826)  98.5   LOC  AO4 AO4   HR   79 90 Lungs   Clear to diminished  Unlabored  Room air  clear to diminished   Unlabored   Room air   B/P   149/60 126/60 Cardiac   RRR, no jvd  rrr. No jvd   Resp   16 16 Skin   Warm and dry Warm and dry   Pain level  7/10 3/10 Edema  +1 BLE     Non pitting   Orders:    Duration:   Start:    0826 End:    1215 Total:   3.45   Dialyzer:   Dialyzer/Set Up Inspection: Greta Huff (03/09/19 0826)   K Bath:   Dialysate K (mEq/L): 2 (03/09/19 0826)   Ca Bath:   Dialysate CA (mEq/L): 2.5 (03/09/19 0826)   Na/Bicarb:   Dialysate NA (mEq/L): 138 (03/09/19 0826)   Target Fluid Removal:   Goal/Amount of Fluid to Remove (mL): 2000 mL (03/09/19 0826)   Access  cvc   Type & Location:   [facility]  Each catheter limb disinfected for 60 seconds per limb with alcohol swabs. Caps removed, dialysis CVC hub scrubbed with Prevantics for 5 seconds, followed by a 5 second dry time per Hospital P&P. Dressing clean dry and intact no s/s of infection. Dressing appears to be from an outside facility. No date written.      Labs     Obtained/Reviewed   Critical Results Called   Date when labs were drawn-  Hgb-    HGB   Date Value Ref Range Status   03/09/2019 10.1 (L) 11.5 - 16.0 g/dL Final     K-    Potassium   Date Value Ref Range Status   12/11/2018 3.8 3.5 - 5.1 mmol/L Final     Ca-   Calcium   Date Value Ref Range Status   12/11/2018 8.0 (L) 8.5 - 10.1 MG/DL Final     Bun-   BUN   Date Value Ref Range Status   12/11/2018 65 (H) 6 - 20 MG/DL Final     Creat-   Creatinine   Date Value Ref Range Status   12/11/2018 5.19 (H) 0.55 - 1.02 MG/DL Final        Medications/ Blood Products Given     Name   Dose   Route and Time                     Blood Volume Processed (BVP):     Net Fluid   Removed:     Comments   Time Out Done: 3564  Primary Nurse Rpt PreElinore Saranya Foley Nurse Rpt Post:Regine  Pt Education: pain in new AVF  Care Plan: ongoing  Tx Summary:   9875YG arrived to HD [address]. Consent signed & on file. SBAR received from Primary RN. Pt has CVC RIJ and new AVF on LFA +B/T created 3/8/19. Pt reports pain in the area with the new AVF.   3674: Both lumens of Pako/permcath disinfected with Alcohol per policy. Each lumen aspirated for blood return and flushed with Normal Saline per policy. Labs drawn per request/ order. VSS. Dialysis Tx initiated. 0900: Pt resting quietly. [facility] rounding. 0945 pt requesting sid cracker and apple juice. 3496 primary rn bedside to give insulin. 1145 pt requesting something to drink. Water given. 1215: Tx ended. VSS. All possible blood returned to patient. Central line catheter flushed with normal saline per policy. Ports disinfected with Alcohol per policy and lines disconnected and capped using aseptic technique. Bed locked and in the lowest position, call bell and belongings in reach. SBAR given to Primary, RN. Patient is stable at time of their/ my departure. All Dialysis related medications have been reviewed. Admiting Diagnosis: creation of AVF  Pt's previous clinic- [address]  Consent signed - Informed Consent Verified: Yes (03/09/19 0826)  [facility] Consent - 3/9/19  Hepatitis Status- negative 3/7/19  Machine #- Machine Number: [id number] (03/09/19 3527)  Telemetry status-  none  Pre-dialysis wt. - Pre-Dialysis Weight: 135 kg (297 lb 9.9 oz) (03/09/19 0826)

## 2019-03-09 NOTE — PROGRESS NOTES
Hospitalist Progress Note    NAME: Winifred Reza   :  1950   MRN:  [id number]       Assessment / Plan:  IDDM type II, uncontrolled with hyperglycemia  Diabetic neuropathy  -hba1c is not reliable at esrd   BS high 300-400  -cont home lantus   Inc pre meal insulin   Dose of nph now and will re assess for additional nph in the afternoon   Cont SS   -cont' gabapentin  -pharm to help with med rec - pending      HTN  -110s on admission, 150 this am  -clonidine and hydralazine was hold yesterday by RN for /40  -cont' clonidine, coreg, lasix, hydralazine  -prn nitrobid     ESRD / hyponatremia   -s/p AV graft on L arm by Dr Tanvi Penn  -HD on TTS  -hyponatremia should be corrected with HD   -nephrology following for HD      JAZMYN, on CPAP and prn oxygen at home  Hyperlipidemia, cont statin   Morbid obesity. Body mass index is 48.04 kg/m².           Code status: Full  Prophylaxis: per primary team     Baseline: lives with dtr   Recommended Disposition: per primary team     Subjective:     Chief Complaint / Reason for Physician Visit: following DM/HTN/ESRD   BS is very high 300-400   Seen during HD today     Discussed with RN events overnight. Review of Systems:  Symptom Y/N Comments  Symptom Y/N Comments   Fever/Chills n   Chest Pain n    Poor Appetite    Edema     Cough    Abdominal Pain n    Sputum    Joint Pain     SOB/LARSEN n   Pruritis/Rash     Nausea/vomit    Tolerating PT/OT     Diarrhea    Tolerating Diet     Constipation    Other       Could NOT obtain due to:      Objective:     VITALS:   Last 24hrs VS reviewed since prior progress note.  Most recent are:  Patient Vitals for the past 24 hrs:   Temp Pulse Resp BP SpO2   19 0725 98.7 °F (37.1 °C) 83 17 140/48 99 %   19 0301 98.6 °F (37 °C) 81 16 137/51 99 %   19 2218 98.7 °F (37.1 °C) 81 16 124/63 99 %   19 1909 99 °F (37.2 °C) 83 16 136/42 100 %   19 1806 -- -- -- 124/45 --   19 1540 98.2 °F (36.8 °C) 83 16 148/83 100 %   03/08/19 1433 98.2 °F (36.8 °C) 82 16 150/59 99 %   03/08/19 1427 98.2 °F (36.8 °C) -- -- (!) 84/38 --   03/08/19 1400 98.8 °F (37.1 °C) 80 15 123/45 97 %   03/08/19 1345 -- 80 14 120/50 98 %   03/08/19 1330 -- 81 13 109/43 99 %   03/08/19 1315 -- 79 12 108/44 98 %   03/08/19 1300 98.9 °F (37.2 °C) 77 9 121/52 99 %   03/08/19 1245 -- 78 14 120/52 100 %   03/08/19 1230 -- 83 10 129/55 100 %   03/08/19 1225 -- 83 11 111/55 100 %   03/08/19 1220 -- 84 13 110/53 100 %   03/08/19 1215 -- 83 13 108/52 100 %   03/08/19 1214 99.3 °F (37.4 °C) 84 10 105/50 100 %   03/08/19 1212 -- 84 10 105/50 99 %       Intake/Output Summary (Last 24 hours) at 3/9/2019 0806  Last data filed at 3/9/2019 0400  Gross per 24 hour   Intake 1060 ml   Output 75 ml   Net 985 ml        PHYSICAL EXAM:  General: WD, WN. Alert, cooperative, no acute distress    EENT:  EOMI. Anicteric sclerae. MMM  Resp:  CTA bilaterally, no wheezing or rales. No accessory muscle use  CV:  Regular  rhythm,  No edema  GI:  Soft, Non distended, Non tender.  +Bowel sounds  Neurologic:  Alert and oriented X 3, normal speech,   Psych:   Good insight. Not anxious nor agitated  Skin:  No rashes. No jaundice    Reviewed most current lab test results and cultures  YES  Reviewed most current radiology test results   YES  Review and summation of old records today    NO  Reviewed patient's current orders and MAR    YES  PMH/SH reviewed - no change compared to H&P  ________________________________________________________________________  Care Plan discussed with:    Comments   Patient y    Family      RN y    Care Manager     Consultant                        Multidiciplinary team rounds were held today with , nursing, pharmacist and clinical coordinator. Patient's plan of care was discussed; medications were reviewed and discharge planning was addressed.      ________________________________________________________________________  Total NON critical care TIME: 28  Minutes    Total CRITICAL CARE TIME Spent:   Minutes non procedure based      Comments   >50% of visit spent in counseling and coordination of care     ________________________________________________________________________  Jerry Wolff MD     Procedures: see electronic medical records for all procedures/Xrays and details which were not copied into this note but were reviewed prior to creation of Plan. LABS:  I reviewed today's most current labs and imaging studies. Pertinent labs include:  No results for input(s): WBC, HGB, HCT, PLT, HGBEXT, HCTEXT, PLTEXT in the last 72 hours. No results for input(s): NA, K, CL, CO2, GLU, BUN, CREA, CA, MG, PHOS, ALB, TBIL, TBILI, SGOT, ALT, INR in the last 72 hours.     No lab exists for component: INREXT    Signed: Jerry Wolff MD

## 2019-03-09 NOTE — PROGRESS NOTES
Name: Jaqueline Gamino   MRN: [id number]  : 1950       Assessment   :                                               Plan:  ESRD  HTN  DM  Mild high K  Mild low Na  Creation of left AVF  Perm cath (right IJ)    TTS HD at [address] [place] [address] (Dr H[facility])     Had HD earlier today. K/Na should expected to have normalized post HD    For d/c today.                  Subjective:  Resting. Got HD earlier today.    dtr at bedside    ROS:   No nausea, no vomiting  No chest pain, no shortness of breath    Exam:  Visit Vitals  /59   Pulse 89   Temp 98 °F (36.7 °C)   Resp 18   Ht 5' 6\" (1.676 m)   Wt 135 kg (297 lb 9.9 oz)   SpO2 100%   BMI 48.04 kg/m²       NAD  Clear  RRR  L Arm AVG patent with bruit+  No edema  R TDC intact    Current Facility-Administered Medications   Medication Dose Route Frequency Last Dose    insulin lispro (HUMALOG) injection 10 Units  10 Units SubCUTAneous TIDAC 10 Units at 19 1409    bisacodyl (DULCOLAX) suppository 10 mg  10 mg Rectal DAILY PRN      carvedilol (COREG) tablet 12.5 mg  12.5 mg Oral BID WITH MEALS Stopped at 19 0819    cloNIDine HCl (CATAPRES) tablet 0.1 mg  0.1 mg Oral BID Stopped at 19 1800    ferrous sulfate tablet 325 mg  1 Tab Oral  mg at 19 0163    furosemide (LASIX) tablet 80 mg  80 mg Oral BID 80 mg at 19 0811    gabapentin (NEURONTIN) capsule 100 mg  100 mg Oral  mg at 19 0810    guaiFENesin (ROBITUSSIN) 100 mg/5 mL oral liquid 200 mg  200 mg Oral TID  mg at 19 1654    hydrALAZINE (APRESOLINE) tablet 100 mg  100 mg Oral BID Stopped at 19 1800    HYDROcodone-acetaminophen (NORCO) 5-325 mg per tablet 1-2 Tab  1-2 Tab Oral Q4H PRN 2 Tab at 19 1419    ipratropium (ATROVENT HFA) 17 mcg inhaler  1 Puff Inhalation TID RT 1 Puff at 19 1409    pantoprazole (PROTONIX) tablet 40 mg  40 mg Oral ACB 40 mg at 03/09/19 0633    . PHARMACY TO SUBSTITUTE PER PROTOCOL (Reordered from: sucroferric oxyhydroxide (VELPHORO) 500 mg chew chewable tablet)    Per Protocol      glucose chewable tablet 16 g  4 Tab Oral PRN      glucagon (GLUCAGEN) injection 1 mg  1 mg IntraMUSCular PRN      sodium chloride (NS) flush 5-40 mL  5-40 mL IntraVENous Q8H 10 mL at 03/09/19 2764    sodium chloride (NS) flush 5-40 mL  5-40 mL IntraVENous PRN 10 mL at 03/08/19 1315    acetaminophen (TYLENOL) tablet 650 mg  650 mg Oral Q6H  mg at 03/09/19 0634    HYDROmorphone (PF) (DILAUDID) injection 0.3 mg  0.3 mg IntraVENous Q4H PRN      naloxone (NARCAN) injection 0.4 mg  0.4 mg IntraVENous PRN      ondansetron (ZOFRAN) injection 4 mg  4 mg IntraVENous Q4H PRN      insulin lispro (HUMALOG) injection   SubCUTAneous AC&HS Stopped at 03/09/19 1130    dextrose (D50W) injection syrg 12.5-25 g  12.5-25 g IntraVENous PRN      insulin glargine (LANTUS) injection 12 Units  12 Units SubCUTAneous QHS 12 Units at 03/08/19 2144    nitroglycerin (NITROBID) 2 % ointment 1 Inch  1 Inch Topical Q6H PRN      **Can pt supply own Velphoro  ( Sucroferric Oxyhydride) ?   Please send message to RX to ID and Label**  1 Each Other DAILY Stopped at 03/08/19 1500    atorvastatin (LIPITOR) tablet 20 mg  20 mg Oral QHS 20 mg at 03/08/19 2144    albuterol (PROVENTIL VENTOLIN) nebulizer solution 2.5 mg  2.5 mg Nebulization Q6H PRN         Labs/Data:    Lab Results   Component Value Date/Time    WBC 7.9 03/09/2019 08:31 AM    HGB 10.1 (L) 03/09/2019 08:31 AM    Hematocrit (POC) 38 03/08/2019 06:56 AM    HCT 32.1 (L) 03/09/2019 08:31 AM    PLATELET 861 52/26/2206 08:31 AM    MCV 97.0 03/09/2019 08:31 AM       Lab Results   Component Value Date/Time    Sodium 131 (L) 03/09/2019 08:31 AM    Potassium 5.3 (H) 03/09/2019 08:31 AM    Chloride 97 03/09/2019 08:31 AM    CO2 22 03/09/2019 08:31 AM    Anion gap 12 03/09/2019 08:31 AM    Glucose 412 (H) 03/09/2019 08:31 AM    BUN 45 (H) 03/09/2019 08:31 AM    Creatinine 5.95 (H) 03/09/2019 08:31 AM    BUN/Creatinine ratio 8 (L) 03/09/2019 08:31 AM    GFR est AA 9 (L) 03/09/2019 08:31 AM    GFR est non-AA 7 (L) 03/09/2019 08:31 AM    Calcium 7.5 (L) 03/09/2019 08:31 AM       Wt Readings from Last 3 Encounters:   03/08/19 135 kg (297 lb 9.9 oz)   12/12/18 133.8 kg (294 lb 15.6 oz)   11/19/18 133.8 kg (295 lb)         Intake/Output Summary (Last 24 hours) at 3/9/2019 1643  Last data filed at 3/9/2019 1212  Gross per 24 hour   Intake 600 ml   Output 2000 ml   Net -1400 ml       Patient seen and examined. Chart reviewed. Labs, data and other pertinent notes reviewed in last 24 hrs.     PMH/SH/FH reviewed and unchanged compared to H&P    Discussed with pt/dtr      Vita Jackson MD

## 2019-03-09 NOTE — PROGRESS NOTES
General Surgery End of Shift Nursing Note    Bedside shift change report given to [address] [place] [address] (oncoming nurse) by Suma Giron and Mason Pederson (offgoing nurse). Report included the following information SBAR, Kardex, Intake/Output, MAR and Accordion. Shift worked:   7a-7p   Summary of shift:    1800 Clonadine and hydralazine held due to 's / 40's. Dressing CDI, Daughter at bedside. Dialysis tomorrow.     Issues for physician to address: none     Number times ambulated in hallway past shift: 0 non-ambulatory      Levi Stall

## 2019-03-09 NOTE — DISCHARGE INSTRUCTIONS
Discharge Instructions Following AV Graft Surgery            Keep dressing in place for two days. Keep incision dry for two days. See Dr. Isis Newberry in the office in two weeks - call for appointment - [phone]. Take pain medications as prescribed as needed or use Tylenol. Do not drive while taking narcotic pain medication.     For any problems, call Dr. Isis Newberry at [phone] or Greer Tomlinson MD

## 2019-03-09 NOTE — PROGRESS NOTES
CM Note:-    Pt discharging home with her daughter who takes care of her. Discharge information conveyed to pt's out-pt dialysis clinic ([facility]).     BETO Barreto, [address]

## 2019-03-09 NOTE — DIALYSIS
TRANSFER - IN REPORT:    Verbal report received from Pedro Pablo on Monna Gals  being received from 2174 for dialysis      Report consisted of patients Situation, Background, Assessment and   Recommendations(SBAR). Information from the following report(s)kardex and labs was reviewed with the receiving nurse. Opportunity for questions and clarification was provided. Assessment completed upon patients arrival to unit and care assumed.

## 2019-03-09 NOTE — PROGRESS NOTES
Bedside shift change report given to Guanaco [address] (oncoming nurse) by Barbara Chavez RN (offgoing nurse). Report included the following information SBAR, Kardex, Intake/Output, MAR and Recent Results    Pt medicated for discomfort as requested. LUE dressing c/d/i. No s/s of bleeding this shift. Daughter @ bedside. Noe Perez

## 2019-03-12 NOTE — OP NOTES
Operative Report    CPT 53885      Insertion of Arterio-Venous Graft Left Arm        Patient:  Sridevi Falcon    Nephrologist  -  Dr John [facility], [address] [facility]    Date of Surgery:  3/12/2019    Preoperative Diagnosis - ESRD    Postoperative Diagnosis - Same    Anesthesia - General    Surgeon - Steph Escobar    Procedure - Insertion of Arterio-Venous Graft left arm    Operative Summary -     The patient was taken to the operating room and placed on the operating table in the supine position. Ultrasound performed after induction of general anesthesia showed small veins not suitable for arteriovenous fistula. The patient's left  was  prepared and steriley draped. An incision was made proximally and medially in the upper arm  The brachial vein was exposed and freed for about 3.5 cm and surrounded by vessel loops. It was around 6 mm in diameter. The brachail artery was exposed adjacent to the vein and surrounded by vessel loops. A counter incision were made distally and with a curved  6 mm tunneler a shallow subcutaneous tunnel was made. IV heparin was given. The  artery was controlled proximally and distally with  Vascular clamps. A short longitudinal arteriotomy was made. The end of a 6 mm PTFE graft was cut obliquely and anastomosed end graft to side artery with CV6 Goretex suture. The graft was drawn through the subcutaneous tunnel in a clockwise direction and brought back up to the upper incision. Control was obtained of the vein with vascular clamps and the vein was opened logitudinally. The end of the graft was cut obliquely to length and was anastomosed end graft to side vein with CV6 Goretex sutures. Prior to completion, the artery was flushed through the graft. After completion, with initiation of flow, there was a thrill at the AV anastomosis and a palpable pulse in the radial artery distally. Protamine was given to reverse heparin.   Oozing from the tunnel medial to the distal counter incision was treated with Surgicel. The wounds were irrigated with antibiotic solution and closed with 3-0 chromic in the subcutaneous layer and with 4-0 nylon at the skin at th ecounter incision. Skin was closed with staples at the proximal incision. .  A gauze dressing was applied. The patient tolerated the procedure well and was taken to the PACU in stable condition.      Specimen - none     Drain - none    Complications - none    Estimated Blood Loss - 150 ml    G Kamryn Parks MD

## 2019-03-25 ENCOUNTER — OFFICE VISIT (OUTPATIENT)
Dept: SURGERY | Age: 69
End: 2019-03-25

## 2019-03-25 ENCOUNTER — TELEPHONE (OUTPATIENT)
Dept: SURGERY | Age: 69
End: 2019-03-25

## 2019-03-25 VITALS
SYSTOLIC BLOOD PRESSURE: 132 MMHG | TEMPERATURE: 98.6 F | HEIGHT: 66 IN | WEIGHT: 293 LBS | HEART RATE: 79 BPM | DIASTOLIC BLOOD PRESSURE: 81 MMHG | OXYGEN SATURATION: 96 % | BODY MASS INDEX: 47.09 KG/M2 | RESPIRATION RATE: 20 BRPM

## 2019-03-25 DIAGNOSIS — Z09 POSTOPERATIVE EXAMINATION: Primary | ICD-10-CM

## 2019-03-25 NOTE — PROGRESS NOTES
Surgery    The patient is S/P insertion of arteriovenous graft. The patient has no complaints. On examination, there is a bruit in the AV graft. There is a palpable radial radial pulse at the wrist.    The incisions are doing well. Sutures were removed. The arteriovenous graft can be used for dialysis starting on 4/2/2019. My office will notify the dialysis unit. The patient can follow up PRN.     Neeta Ridley MD      CC:    Nephrologist  -  Dr Landon Viramontes, [address] [place] [address] [facility]

## 2019-03-25 NOTE — TELEPHONE ENCOUNTER
Spoke with dialysis nurse. Per gerson Scott to use AV graft left arm starting Tuesday, 4/2/19. Faxed 3/25/19 office note to dialysis at (f) [phone], confirmation received.

## 2019-03-25 NOTE — PROGRESS NOTES
Chief Complaint   Patient presents with    Chronic Kidney Disease     Insertion of Arterio-Venous Graft Left Arm 03/08/2019       1. Have you been to the ER, urgent care clinic since your last visit? Hospitalized since your last visit? No    2. Have you seen or consulted any other health care providers outside of the [address] [place] [address] since your last visit? Include any pap smears or colon screening.  No

## 2020-06-08 ENCOUNTER — ED HISTORICAL/CONVERTED ENCOUNTER (OUTPATIENT)
Dept: OTHER | Age: 70
End: 2020-06-08

## 2020-09-25 DIAGNOSIS — N18.6 ESRD (END STAGE RENAL DISEASE) (HCC): ICD-10-CM

## 2020-09-25 PROBLEM — D64.9 ANEMIA: Status: ACTIVE | Noted: 2020-09-25

## 2020-09-25 PROBLEM — J44.9 COPD (CHRONIC OBSTRUCTIVE PULMONARY DISEASE) (HCC): Status: ACTIVE | Noted: 2020-09-25

## 2020-09-25 PROBLEM — I50.9 CHF (CONGESTIVE HEART FAILURE) (HCC): Status: ACTIVE | Noted: 2020-09-25

## 2020-09-25 PROBLEM — K21.9 GERD (GASTROESOPHAGEAL REFLUX DISEASE): Status: ACTIVE | Noted: 2020-09-25

## 2020-09-28 ENCOUNTER — OFFICE VISIT (OUTPATIENT)
Dept: GASTROENTEROLOGY | Age: 70
End: 2020-09-28
Payer: MEDICARE

## 2020-09-28 VITALS
OXYGEN SATURATION: 97 % | DIASTOLIC BLOOD PRESSURE: 64 MMHG | RESPIRATION RATE: 16 BRPM | HEART RATE: 74 BPM | TEMPERATURE: 97.4 F | SYSTOLIC BLOOD PRESSURE: 140 MMHG | BODY MASS INDEX: 49.26 KG/M2 | HEIGHT: 65 IN

## 2020-09-28 DIAGNOSIS — Z86.010 HISTORY OF COLON POLYPS: ICD-10-CM

## 2020-09-28 DIAGNOSIS — D63.8 ANEMIA OF CHRONIC DISEASE: ICD-10-CM

## 2020-09-28 DIAGNOSIS — E66.01 OBESITY, MORBID (HCC): ICD-10-CM

## 2020-09-28 DIAGNOSIS — K29.50 ANTRAL GASTRITIS: ICD-10-CM

## 2020-09-28 DIAGNOSIS — K25.7: Primary | ICD-10-CM

## 2020-09-28 DIAGNOSIS — K25.4 GASTROINTESTINAL HEMORRHAGE ASSOCIATED WITH GASTRIC ULCER: ICD-10-CM

## 2020-09-28 DIAGNOSIS — D50.0 IRON DEFICIENCY ANEMIA SECONDARY TO BLOOD LOSS (CHRONIC): ICD-10-CM

## 2020-09-28 PROCEDURE — 99203 OFFICE O/P NEW LOW 30 MIN: CPT | Performed by: INTERNAL MEDICINE

## 2020-09-28 RX ORDER — PANTOPRAZOLE SODIUM 40 MG/1
40 TABLET, DELAYED RELEASE ORAL DAILY
COMMUNITY

## 2020-09-28 RX ORDER — NITROGLYCERIN 80 MG/1
1 PATCH TRANSDERMAL DAILY
COMMUNITY

## 2020-09-28 NOTE — PROGRESS NOTES
Geraldo Hoover is a 69 y.o. female who presents today for the following:  Chief Complaint   Patient presents with    Anemia     Referred by [facility] for anemia, recent hospital discharge 9-. hgb decreased to 6, , gi blood loss, hgb now 10.2 as of 9-         Allergies   Allergen Reactions    Sulfadiazine Other (comments)       Current Outpatient Medications   Medication Sig    pantoprazole (PROTONIX) 40 mg tablet Take 40 mg by mouth daily.  nitroglycerin (NITRODUR) 0.4 mg/hr 1 Patch by TransDERmal route daily.  insulin lispro protamine/insulin lispro (HUMALOG MIX 50-50 INSULN U-100) 100 unit/mL (50-50) injection 10 Units by SubCUTAneous route three (3) times daily. Sliding scale also    insulin glargine (LANTUS) 100 unit/mL injection 12 Units by SubCUTAneous route nightly.  sucroferric oxyhydroxide (VELPHORO) 500 mg chew chewable tablet Take  by mouth three (3) times daily (with meals).  acetaminophen (TYLENOL) 325 mg tablet Take 650 mg by mouth every four (4) hours as needed for Pain.  carvedilol (COREG) 6.25 mg tablet Take 12.5 mg by mouth two (2) times daily (with meals).  furosemide (LASIX) 80 mg tablet Take 80 mg by mouth daily.  gabapentin (NEURONTIN) 100 mg capsule Take  by mouth three (3) times daily.  hydrALAZINE (APRESOLINE) 100 mg tablet Take  by mouth two (2) times a day.  rosuvastatin (CRESTOR) 10 mg tablet Take 10 mg by mouth nightly.  guaiFENesin (ROBITUSSIN) 100 mg/5 mL liquid Take 200 mg by mouth three (3) times daily as needed for Cough.  ipratropium (ATROVENT HFA) 17 mcg/actuation inhaler Take 1 Puff by inhalation. No current facility-administered medications for this visit.         Past Medical History:   Diagnosis Date    Anemia 9/25/2020    Arthritis     CHF (congestive heart failure) ([facility] [address] 9/25/2020    Chronic kidney disease     [facility] t-th-s    Congestive heart failure (HCC)     COPD (chronic obstructive pulmonary disease) ([facility] [address] 9/25/2020    Diabetes ([facility] [address]     GERD (gastroesophageal reflux disease)     Hypertension     Morbid obesity ([facility] [address]     JAZMYN (obstructive sleep apnea)     Psychiatric disorder     depressed    Sleep apnea     CPAP       Past Surgical History:   Procedure Laterality Date    BREAST SURGERY PROCEDURE UNLISTED      R breast mass removed    COLONOSCOPY  08/17/2020    SESSILE POLYP REMOVAL    ENDOSCOPY VISIT-OUTPATIENT  03/22/2018    CHRONIC GI BLOOD LOSS ANEMIA    ENDOSCOPY VISIT-OUTPATIENT  08/17/2020    GASTRITIS, PREPYLORIC ULCER, DUODENITIS    HX CHOLECYSTECTOMY      HX HYSTERECTOMY      HX ORTHOPAEDIC      L big toe removed    HX ORTHOPAEDIC      R knee scope    HX OTHER SURGICAL      DIALYSIS CATHETER right neck    HX VASCULAR ACCESS Right     neck- dialysis    VASCULAR SURGERY PROCEDURE UNLIST  03/08/2019    Insertion of Arterio-Venous Graft Left Arm       Family History   Problem Relation Age of Onset    Diabetes Father     Hypertension Father        Social History     Socioeconomic History    Marital status: SINGLE     Spouse name: Not on file    Number of children: Not on file    Years of education: Not on file    Highest education level: Not on file   Occupational History    Not on file   Social Needs    Financial resource strain: Not on file    Food insecurity     Worry: Not on file     Inability: Not on file    Transportation needs     Medical: Not on file     Non-medical: Not on file   Tobacco Use    Smoking status: Never Smoker    Smokeless tobacco: Never Used   Substance and Sexual Activity    Alcohol use: No     Frequency: Never    Drug use: No    Sexual activity: Not on file   Lifestyle    Physical activity     Days per week: Not on file     Minutes per session: Not on file    Stress: Not on file   Relationships    Social connections     Talks on phone: Not on file     Gets together: Not on file     Attends Jew service: Not on file Active member of club or organization: Not on file     Attends meetings of clubs or organizations: Not on file     Relationship status: Not on file    Intimate partner violence     Fear of current or ex partner: Not on file     Emotionally abused: Not on file     Physically abused: Not on file     Forced sexual activity: Not on file   Other Topics Concern    Not on file   Social History Narrative    Not on file         HPI  67-year-old female with history of hypertension, hyperlipidemia, heart failure, diabetes mellitus type 2, renal disease dialysis dependent, PD, esophageal reflux disease, and obstructive sleep apnea who comes in for evaluation of anemia. Patient states she came very tired and sleepy along with long desire to eat ice. She was hospitalized in August 2018 and found to have a hemoglobin in the 6 range. He had an EGD and colonoscopy at that time which showed a hiatal hernia, antral gastritis, a prepyloric ulcer, along with 2 colon polyps which were removed. States she has no gross GI bleeding. She states her stools have been black, but she is on iron therapy. She denies use of NSAIDs. No nausea vomiting. She is eating well and has good appetite. She has noticed some weight loss. She was started on pantoprazole 40 mg daily. Had a CT done on 9/4/2020 which showed a hemoglobin of 6.0 and hematocrit 21.1. She had a repeat blood count on 9/16/2020 which showed a hemoglobin of 10.2 and hematocrit of 30.6. Patient states she had previous colonoscopy in 2018 along with an EGD. Patient states she has had a capsule endoscopy done by Dr. Maged Mora in the past which she states was unremarkable. Review of Systems   Constitutional: Positive for weight loss. HENT: Negative. Negative for nosebleeds. Eyes: Negative. Respiratory: Negative. Cardiovascular: Negative. Gastrointestinal: Positive for abdominal pain, blood in stool, heartburn and melena. Genitourinary: Negative. Musculoskeletal: Negative. Skin: Negative. Neurological: Negative. Endo/Heme/Allergies: Negative. Psychiatric/Behavioral: Negative. All other systems reviewed and are negative. Visit Vitals  BP (!) 140/64 (BP 1 Location: Right arm, BP Patient Position: Sitting)   Pulse 74   Temp 97.4 °F (36.3 °C) (Skin)   Resp 16   Ht 5' 5\" (1.651 m)   SpO2 97% Comment: 2 l/m oxygen   BMI 49.26 kg/m²     Physical Exam  Vitals signs and nursing note reviewed. Constitutional:       General: She is not in acute distress. Appearance: Normal appearance. She is obese. She is not ill-appearing. HENT:      Head: Normocephalic and atraumatic. Nose: Nose normal.      Mouth/Throat:      Mouth: Mucous membranes are moist.      Pharynx: Oropharynx is clear. Eyes:      General: No scleral icterus. Conjunctiva/sclera: Conjunctivae normal.      Pupils: Pupils are equal, round, and reactive to light. Neck:      Musculoskeletal: Normal range of motion and neck supple. Cardiovascular:      Rate and Rhythm: Normal rate and regular rhythm. Pulses: Normal pulses. Heart sounds: Normal heart sounds. Pulmonary:      Effort: Pulmonary effort is normal.      Breath sounds: Normal breath sounds. Abdominal:      General: Bowel sounds are normal. There is no distension. Palpations: Abdomen is soft. There is no mass. Tenderness: There is abdominal tenderness. There is no right CVA tenderness, left CVA tenderness, guarding or rebound. Hernia: No hernia is present. Comments: Obese   Musculoskeletal: Normal range of motion. Skin:     General: Skin is warm and dry. Coloration: Skin is not jaundiced. Neurological:      General: No focal deficit present. Mental Status: She is alert and oriented to person, place, and time. Psychiatric:         Mood and Affect: Mood normal.         Behavior: Behavior normal.         Thought Content:  Thought content normal.         Judgment: Judgment normal.            1. Chronic prepyloric ulcer  Continue the pantoprazole 40 mg daily. 2. Antral gastritis  Continue the pantoprazole. 3. History of colon polyps  We will plan to repeat colonoscopy in 3 years  4. Anemia of chronic disease  Probably secondary to patient's kidney failure  Continue to follow blood count every 2 weeks on dialysis    5. Gastrointestinal hemorrhage associated with gastric ulcer  Consider a repeat EGD if the blood count drops again. Patient also may be considered for a repeat capsule endoscopy    6. Iron deficiency anemia secondary to blood loss (blood loss)  Secondary to chronic blood loss  7.  Obesity, morbid ([address]

## 2020-10-09 ENCOUNTER — TELEPHONE (OUTPATIENT)
Dept: GASTROENTEROLOGY | Age: 70
End: 2020-10-09

## 2020-10-09 NOTE — TELEPHONE ENCOUNTER
SREE GONZALES FROM [facility] CALLED SAID THE [place] [facility] NURSE CONTACTED HER AND SAID [facility] IS DROPPING AGAIN. THEY ARE FAXING LABS.  HGB 8.1

## 2020-10-12 NOTE — TELEPHONE ENCOUNTER
I called and spoke with Helga Medrano, she said her mom was at [facility] in [facility], getting blood. She also had a recent covid test. I explained that as soon as we can, we need to get her scheduled for an EGD. She will have the hospital nurse call me tomorrow,  so we can plan this.

## 2020-10-21 ENCOUNTER — TELEPHONE (OUTPATIENT)
Dept: GASTROENTEROLOGY | Age: 70
End: 2020-10-21

## 2020-10-21 NOTE — TELEPHONE ENCOUNTER
JOHN CALLED , SAID MOM Bryn E Abdoulaye White. DOING BETTER. I WILL CALL TREV GONZALES TOMORROW TO OBTAIN RECORDS. WE WILL PLAN EGD AND I WILL CALL JOHN BACK.

## 2020-10-22 ENCOUNTER — TELEPHONE (OUTPATIENT)
Dept: GASTROENTEROLOGY | Age: 70
End: 2020-10-22

## 2020-10-22 NOTE — TELEPHONE ENCOUNTER
I called and spoke with Jerry Tang. Explained we got the records for [id number] [place] [address] up to 9. We can schedule the egd for 10- at 11:00 am. I will notify Marna Runner at [facility] to set up transportation. She said ok.

## 2020-10-22 NOTE — TELEPHONE ENCOUNTER
I called and talked with Scarlett Carrasco at [facility]. She is faxing us the most recent hospital records from [place]. Patient was d/c on 10-. We need to schedule her for EGD. I talked with Rachel Irene yesterday and told her as soon as I have a date and time I will call her. We are aiming for 10-.

## 2020-10-23 ENCOUNTER — TELEPHONE (OUTPATIENT)
Dept: GASTROENTEROLOGY | Age: 70
End: 2020-10-23

## 2020-10-23 DIAGNOSIS — K92.2 GASTROINTESTINAL HEMORRHAGE, UNSPECIFIED GASTROINTESTINAL HEMORRHAGE TYPE: ICD-10-CM

## 2020-10-23 DIAGNOSIS — D50.0 IRON DEFICIENCY ANEMIA DUE TO CHRONIC BLOOD LOSS: Primary | ICD-10-CM

## 2020-10-23 NOTE — TELEPHONE ENCOUNTER
I called and spoke with Catia Mahan at [facility] her the EGD is scheduled for 10- at 11:00, arrival time is 10:00 am. She said she will set up transportation.     I also called  [facility] community plan and No PA is required for EGD-per Scott-REF # [id number]. 10- at 9:33 am

## 2020-10-30 ENCOUNTER — ANESTHESIA EVENT (OUTPATIENT)
Dept: ENDOSCOPY | Age: 70
End: 2020-10-30
Payer: MEDICARE

## 2020-10-30 ENCOUNTER — ANESTHESIA (OUTPATIENT)
Dept: ENDOSCOPY | Age: 70
End: 2020-10-30
Payer: MEDICARE

## 2020-10-30 ENCOUNTER — HOSPITAL ENCOUNTER (OUTPATIENT)
Age: 70
Setting detail: OUTPATIENT SURGERY
Discharge: HOME | End: 2020-10-30
Attending: INTERNAL MEDICINE | Admitting: INTERNAL MEDICINE
Payer: MEDICARE

## 2020-10-30 VITALS
SYSTOLIC BLOOD PRESSURE: 120 MMHG | BODY MASS INDEX: 48.82 KG/M2 | RESPIRATION RATE: 20 BRPM | HEART RATE: 77 BPM | TEMPERATURE: 98.3 F | DIASTOLIC BLOOD PRESSURE: 55 MMHG | HEIGHT: 65 IN | OXYGEN SATURATION: 98 % | WEIGHT: 293 LBS

## 2020-10-30 LAB
GLUCOSE BLD STRIP.AUTO-MCNC: 288 MG/DL (ref 65–100)
PERFORMED BY, TECHID: ABNORMAL

## 2020-10-30 PROCEDURE — 2709999900 HC NON-CHARGEABLE SUPPLY: Performed by: INTERNAL MEDICINE

## 2020-10-30 PROCEDURE — 76040000019: Performed by: INTERNAL MEDICINE

## 2020-10-30 PROCEDURE — 74011250636 HC RX REV CODE- 250/636: Performed by: INTERNAL MEDICINE

## 2020-10-30 PROCEDURE — 88305 TISSUE EXAM BY PATHOLOGIST: CPT

## 2020-10-30 PROCEDURE — 77030021593 HC FCPS BIOP ENDOSC BSC -A: Performed by: INTERNAL MEDICINE

## 2020-10-30 PROCEDURE — 74011250636 HC RX REV CODE- 250/636: Performed by: NURSE ANESTHETIST, CERTIFIED REGISTERED

## 2020-10-30 PROCEDURE — 76060000031 HC ANESTHESIA FIRST 0.5 HR: Performed by: INTERNAL MEDICINE

## 2020-10-30 PROCEDURE — 74011000250 HC RX REV CODE- 250: Performed by: NURSE ANESTHETIST, CERTIFIED REGISTERED

## 2020-10-30 PROCEDURE — 88312 SPECIAL STAINS GROUP 1: CPT

## 2020-10-30 PROCEDURE — 43239 EGD BIOPSY SINGLE/MULTIPLE: CPT | Performed by: INTERNAL MEDICINE

## 2020-10-30 PROCEDURE — 82962 GLUCOSE BLOOD TEST: CPT

## 2020-10-30 RX ORDER — SODIUM CHLORIDE 0.9 % (FLUSH) 0.9 %
5-40 SYRINGE (ML) INJECTION EVERY 8 HOURS
Status: DISCONTINUED | OUTPATIENT
Start: 2020-10-30 | End: 2020-10-30 | Stop reason: HOSPADM

## 2020-10-30 RX ORDER — SODIUM CHLORIDE, SODIUM LACTATE, POTASSIUM CHLORIDE, CALCIUM CHLORIDE 600; 310; 30; 20 MG/100ML; MG/100ML; MG/100ML; MG/100ML
INJECTION, SOLUTION INTRAVENOUS
Status: DISCONTINUED | OUTPATIENT
Start: 2020-10-30 | End: 2020-10-30 | Stop reason: HOSPADM

## 2020-10-30 RX ORDER — KETAMINE HYDROCHLORIDE 10 MG/ML
INJECTION, SOLUTION INTRAMUSCULAR; INTRAVENOUS AS NEEDED
Status: DISCONTINUED | OUTPATIENT
Start: 2020-10-30 | End: 2020-10-30 | Stop reason: HOSPADM

## 2020-10-30 RX ORDER — SODIUM CHLORIDE, SODIUM LACTATE, POTASSIUM CHLORIDE, CALCIUM CHLORIDE 600; 310; 30; 20 MG/100ML; MG/100ML; MG/100ML; MG/100ML
125 INJECTION, SOLUTION INTRAVENOUS CONTINUOUS
Status: DISCONTINUED | OUTPATIENT
Start: 2020-10-30 | End: 2020-10-30 | Stop reason: HOSPADM

## 2020-10-30 RX ORDER — SODIUM CHLORIDE 0.9 % (FLUSH) 0.9 %
5-40 SYRINGE (ML) INJECTION AS NEEDED
Status: DISCONTINUED | OUTPATIENT
Start: 2020-10-30 | End: 2020-10-30 | Stop reason: HOSPADM

## 2020-10-30 RX ORDER — MIDAZOLAM HYDROCHLORIDE 1 MG/ML
INJECTION, SOLUTION INTRAMUSCULAR; INTRAVENOUS AS NEEDED
Status: DISCONTINUED | OUTPATIENT
Start: 2020-10-30 | End: 2020-10-30 | Stop reason: HOSPADM

## 2020-10-30 RX ADMIN — Medication 10 MG: at 12:04

## 2020-10-30 RX ADMIN — MIDAZOLAM 1 MG: 1 INJECTION INTRAMUSCULAR; INTRAVENOUS at 11:59

## 2020-10-30 RX ADMIN — Medication 20 MG: at 11:59

## 2020-10-30 RX ADMIN — SODIUM CHLORIDE, POTASSIUM CHLORIDE, SODIUM LACTATE AND CALCIUM CHLORIDE 125 ML/HR: 600; 310; 30; 20 INJECTION, SOLUTION INTRAVENOUS at 11:35

## 2020-10-30 RX ADMIN — MIDAZOLAM 1 MG: 1 INJECTION INTRAMUSCULAR; INTRAVENOUS at 11:58

## 2020-10-30 RX ADMIN — SODIUM CHLORIDE, POTASSIUM CHLORIDE, SODIUM LACTATE AND CALCIUM CHLORIDE: 600; 310; 30; 20 INJECTION, SOLUTION INTRAVENOUS at 11:59

## 2020-10-30 NOTE — OP NOTES
EGD Procedure Note        Patient: Larry Rankin MRN: [id number]  SSN: [id number]    YOB: 1950  Age: 69 y.o. Sex: female        Date/Time:  10/30/2020 12:12 PM         IMPRESSION:       1. Antral gastritis  2. Hypertrophic gastric folds (antrum)              3.   Hiatal hernia (small)              4.   Distal esophagitis (grade 2)     RECOMMENDATIONS:    1. Check biopsy results. 2. Continue the pantoprazole 40 mg daily. 3. Continue to closely monitor H&H and stools of blood. 4. If there is continued drop in blood counts then a repeat capsule endoscopy. Procedure: Esophagogastroduodenoscopy with cold biopsies    Indication: GI Bleeding, ALISHA    Endoscopist:  Roger Lai MD    Referring Provider:   Orion Barnes MD    History: The history and physical exam were reviewed and updated. Endoscope: GIF H190 Olympus video endoscope    Extent of Exam: second portion of the duodenum    ASA: ASA 3 - Patient with moderate systemic disease with functional limitations    Anethesia/Sedation:  MAC    Description of the procedure: The procedure was discussed with the patient including risks, benefits, alternatives including risks of iv sedation, bleeding, perforation and aspiration. A safety timeout was performed. The patient was placed in the left lateral decubitus position. A bite block was placed. The patient was using standard protocol. The patients vital signs were monitored at all times including heart rate/rhythm, blood pressure and oxygen saturation. The endoscope was then passed under direct visualization to the second portion of the duodenum. The endoscope was then slowly withdrawn while visualizing the mucosa. In the stomach a retroflexion was performed and gastric fundus and cardia visualized. The patient was then transferred to recovery in stable condition. Findings:   Esophagus: The esophageal mucosa was flamed in the most distal part consistent with a grade 2 esophagitis. Patient had a small to moderate sized hiatal hernia sac. Stomach: The gastric mucosa was inflamed throughout the gastric antrum. The prepyloric folds were quite prominent and hypertrophic throughout that region. Multiple biopsies were taken in the gastric antrum of this finding. No ulcers were noted at this time. Duodenum: The duodenum mucosa was normal with no ulceration, mass, stricture and no evidence of villous atrophy. Therapies: None    Specimens:   ID Type Source Tests Collected by Time Destination   1 : gastric antrum Preservative Stomach, Antrum  Kristi Esqueda MD 10/30/2020 1200 Pathology               EBL: Minimal    Complications:   None; patient tolerated the procedure well.      Implants: None    Discharge disposition:  Out of the recovery area when discharge criteria met         Molly Hollingsworth MD  October 30, 2020  12:12 PM

## 2020-10-30 NOTE — ANESTHESIA PREPROCEDURE EVALUATION
Relevant Problems   No relevant active problems       Anesthetic History   No history of anesthetic complications  Other anesthesia complications          Review of Systems / Medical History  Patient summary reviewed, nursing notes reviewed and pertinent labs reviewed    Pulmonary    COPD    Sleep apnea           Neuro/Psych         Psychiatric history     Cardiovascular    Hypertension      CHF             GI/Hepatic/Renal     GERD           Endo/Other    Diabetes    Morbid obesity, arthritis and anemia     Other Findings              Physical Exam    Airway  Mallampati: III    Neck ROM: normal range of motion        Cardiovascular    Rhythm: regular  Rate: normal         Dental  No notable dental hx       Pulmonary  Breath sounds clear to auscultation               Abdominal  Abdominal exam normal       Other Findings            Anesthetic Plan    ASA: 4  Anesthesia type: MAC            Anesthetic plan and risks discussed with: Patient

## 2020-10-30 NOTE — DISCHARGE INSTRUCTIONS

## 2020-10-30 NOTE — ANESTHESIA POSTPROCEDURE EVALUATION
Procedure(s):  ESOPHAGOGASTRODUODENOSCOPY (EGD). MAC    Anesthesia Post Evaluation      Multimodal analgesia: multimodal analgesia not used between 6 hours prior to anesthesia start to PACU discharge  Patient location during evaluation: PACU  Patient participation: complete - patient participated  Level of consciousness: awake  Pain management: adequate  Anesthetic complications: no  Cardiovascular status: acceptable  Respiratory status: acceptable  Hydration status: acceptable  Comments:   Procedure(s):  ESOPHAGOGASTRODUODENOSCOPY (EGD).     MAC    [unfilled]    Post-op Vital signs  BP: 127/50 (10/30/2020 12:16 PM)  Temp: 36.8 °C (98.3 °F) (10/30/2020 12:16 PM)  Pulse: 73 (10/30/2020 12:16 PM)  Resp: 16 (10/30/2020 12:16 PM)  SpO2: 100 % (10/30/2020 12:16 PM)         Post anesthesia nausea and vomiting:  none  Final Post Anesthesia Temperature Assessment:  Normothermia (36.0-37.5 degrees C)      INITIAL Post-op Vital signs:   Vitals Value Taken Time   /50 10/30/2020 12:16 PM   Temp 36.8 °C (98.3 °F) 10/30/2020 12:16 PM   Pulse 73 10/30/2020 12:16 PM   Resp 16 10/30/2020 12:16 PM   SpO2 100 % 10/30/2020 12:16 PM

## 2020-10-30 NOTE — H&P
History and Physical    Ricky Liliana        1950  [id number]        [id number]     Pre-Procedure Diagnosis:  Anemia, GI Bleeding    Chief Complaint:  No chief complaint on file. HPI: 65-year-old female with history of hypertension, hyperlipidemia, heart failure, diabetes mellitus type 2, end-stage renal disease dialysis dependent, esophageal reflux disease, and obstructive sleep apnea who comes in for evaluation of anemia. Patient has had recent work-up which included an EGD and colonoscopy however she continues to have a drop in the blood count which is felt to be an upper digestive tract.     Past Medical History:   Diagnosis Date    Anemia 9/25/2020    Arthritis     CHF (congestive heart failure) ([facility] [address] 9/25/2020    Chronic kidney disease     Altanaveed Salazarsouleymane Garcia [id number]    Congestive heart failure (HCC)     COPD (chronic obstructive pulmonary disease) ([facility] [address] 9/25/2020    Diabetes (HCC)     GERD (gastroesophageal reflux disease)     Hypertension     Morbid obesity ([facility] [address]     JAZMYN (obstructive sleep apnea)     Psychiatric disorder     depressed    Sleep apnea     CPAP     Past Surgical History:   Procedure Laterality Date    BREAST SURGERY PROCEDURE UNLISTED      R breast mass removed    COLONOSCOPY  08/17/2020    SESSILE POLYP REMOVAL    ENDOSCOPY VISIT-OUTPATIENT  03/22/2018    CHRONIC GI BLOOD LOSS ANEMIA    ENDOSCOPY VISIT-OUTPATIENT  08/17/2020    GASTRITIS, PREPYLORIC ULCER, DUODENITIS    HX CHOLECYSTECTOMY      HX HYSTERECTOMY      HX ORTHOPAEDIC      L big toe removed    HX ORTHOPAEDIC      R knee scope    HX OTHER SURGICAL      DIALYSIS CATHETER right neck    HX VASCULAR ACCESS Right     neck- dialysis    VASCULAR SURGERY PROCEDURE UNLIST  03/08/2019    Insertion of Arterio-Venous Graft Left Arm     Family History   Problem Relation Age of Onset    Diabetes Father     Hypertension Father      Social History     Socioeconomic History    Marital status: SINGLE Spouse name: Not on file    Number of children: Not on file    Years of education: Not on file    Highest education level: Not on file   Tobacco Use    Smoking status: Never Smoker    Smokeless tobacco: Never Used   Substance and Sexual Activity    Alcohol use: No     Frequency: Never    Drug use: No       Allergies: Allergies   Allergen Reactions    Sulfadiazine Other (comments)     Medications:   No current facility-administered medications for this encounter. Vital Signs There were no vitals taken for this visit. Review of Systems  A comprehensive review of systems was negative except for that written in the History of Present Illness. Physical Exam:  General:  Alert, cooperative, no distress, appears stated age. Eyes:  No icterus   Neck: Supple, no adenopathy and no JVD. Lungs:   Clear to auscultation bilaterally. Heart:  Regular rate and rhythm, S1, S2 normal, no murmur, click, rub or gallop. Abdomen:   Soft, non-tender. Bowel sounds normal. No masses,  No organomegaly. Neurologic: Grossly intact. Laboratory Data:  No results found for this or any previous visit (from the past 24 hour(s)). Hospital Problems  Date Reviewed: 9/28/2020    None          Impression and Plan:  GI bleedingsecondary to gastric ulcer which was noted on previous examination.   Iron deficiency anemia  History of prepyloric gastric ulcer  -Patient for an upper endoscopy to further evaluate    Sallie Rolon MD  10/30/2020  10:52 AM

## 2020-10-30 NOTE — INTERVAL H&P NOTE
Update History & Physical 
 
The Patient's History and Physical of October 30, 2020  
 was reviewed with the patient and I examined the patient. There was no change. The surgical site was confirmed by the patient and me. Plan:  The risk, benefits, expected outcome, and alternative to the recommended procedure have been discussed with the patient. Patient understands and wants to proceed with the procedure.  
 
Electronically signed by Teja Marte MD on 10/30/2020 at 10:56 AM

## 2020-11-03 ENCOUNTER — TELEPHONE (OUTPATIENT)
Dept: GASTROENTEROLOGY | Age: 70
End: 2020-11-03

## 2020-11-03 NOTE — PROGRESS NOTES
Tell patient that the biopsies taken in her stomach showed gastritis/inflammation. No infection was noted. She should continue the pantoprazole 40 mg daily.

## 2020-11-04 ENCOUNTER — TELEPHONE (OUTPATIENT)
Dept: GASTROENTEROLOGY | Age: 70
End: 2020-11-04

## 2020-11-04 NOTE — TELEPHONE ENCOUNTER
I called Nicky Simpson and explained I will fax the info to her. I just called and gave results to Mitra Disla.

## 2022-03-18 PROBLEM — N18.6 ESRD (END STAGE RENAL DISEASE) (HCC): Status: ACTIVE | Noted: 2019-03-08

## 2022-03-18 PROBLEM — J44.9 COPD (CHRONIC OBSTRUCTIVE PULMONARY DISEASE) (HCC): Status: ACTIVE | Noted: 2020-09-25

## 2022-03-18 PROBLEM — D64.9 ANEMIA: Status: ACTIVE | Noted: 2020-09-25

## 2022-03-18 PROBLEM — R73.9 HYPERGLYCEMIA: Status: ACTIVE | Noted: 2018-12-07

## 2022-03-19 PROBLEM — E66.01 OBESITY, MORBID (HCC): Status: ACTIVE | Noted: 2018-03-07

## 2022-03-19 PROBLEM — I50.9 CHF (CONGESTIVE HEART FAILURE) (HCC): Status: ACTIVE | Noted: 2020-09-25

## 2022-03-20 PROBLEM — K21.9 GERD (GASTROESOPHAGEAL REFLUX DISEASE): Status: ACTIVE | Noted: 2020-09-25

## 2022-03-20 PROBLEM — Z99.2 CKD (CHRONIC KIDNEY DISEASE) STAGE V REQUIRING CHRONIC DIALYSIS (HCC): Status: ACTIVE | Noted: 2018-03-29

## 2022-03-20 PROBLEM — N18.6 CKD (CHRONIC KIDNEY DISEASE) STAGE V REQUIRING CHRONIC DIALYSIS (HCC): Status: ACTIVE | Noted: 2018-03-29

## 2023-05-15 RX ORDER — FUROSEMIDE 80 MG
80 TABLET ORAL DAILY
COMMUNITY

## 2023-05-15 RX ORDER — ROSUVASTATIN CALCIUM 10 MG/1
10 TABLET, COATED ORAL NIGHTLY
COMMUNITY

## 2023-05-15 RX ORDER — NITROGLYCERIN 80 MG/1
1 PATCH TRANSDERMAL DAILY
COMMUNITY

## 2023-05-15 RX ORDER — GUAIFENESIN 200 MG/10ML
200 LIQUID ORAL 3 TIMES DAILY PRN
COMMUNITY

## 2023-05-15 RX ORDER — PANTOPRAZOLE SODIUM 40 MG/1
40 TABLET, DELAYED RELEASE ORAL DAILY
COMMUNITY

## 2023-05-15 RX ORDER — ACETAMINOPHEN 325 MG/1
650 TABLET ORAL EVERY 4 HOURS PRN
COMMUNITY

## 2023-05-15 RX ORDER — INSULIN GLARGINE 100 [IU]/ML
12 INJECTION, SOLUTION SUBCUTANEOUS
COMMUNITY
Start: 2018-12-12

## 2023-05-15 RX ORDER — GABAPENTIN 100 MG/1
CAPSULE ORAL 3 TIMES DAILY
COMMUNITY

## 2023-05-15 RX ORDER — CARVEDILOL 6.25 MG/1
12.5 TABLET ORAL 2 TIMES DAILY WITH MEALS
COMMUNITY

## 2023-05-15 RX ORDER — HYDRALAZINE HYDROCHLORIDE 100 MG/1
TABLET, FILM COATED ORAL 2 TIMES DAILY
COMMUNITY

## (undated) DEVICE — SUT ETHLN 4-0 18IN PS2 BLK --

## (undated) DEVICE — INTENDED FOR TISSUE SEPARATION, AND OTHER PROCEDURES THAT REQUIRE A SHARP SURGICAL BLADE TO PUNCTURE OR CUT.: Brand: BARD-PARKER ® CARBON RIB-BACK BLADES

## (undated) DEVICE — SLIM BODY SKIN STAPLER: Brand: APPOSE ULC

## (undated) DEVICE — STERILE POLYISOPRENE POWDER-FREE SURGICAL GLOVES: Brand: PROTEXIS

## (undated) DEVICE — INTENDED USED TO PROTECT, TAG AND HELP LOCATED SUTURES DURING SURGERY: Brand: STERION®SUTURE AID BOOTIES

## (undated) DEVICE — SOLUTION LACTATED RINGERS INJECTION USP

## (undated) DEVICE — FCPS RAD JAW 4 SC 240CM W/NDL --

## (undated) DEVICE — SUTURE PERMAHAND SZ 2-0 L30IN NONABSORBABLE BLK SILK W/O A305H

## (undated) DEVICE — SOL IRR STRL H2O 1000ML BTL --

## (undated) DEVICE — CLAMP,EDSLAB HANDLELESS 8MM DBLE SOFTJAW: Brand: FOGARTY SOFTJAW

## (undated) DEVICE — LOOP VES MAXI YEL 2/PK

## (undated) DEVICE — TOWEL SURG W17XL27IN STD BLU COT NONFENESTRATED PREWASHED

## (undated) DEVICE — SUT SLK 2 60IN TIE MP BLK --

## (undated) DEVICE — 1200CC GUARDIAN II: Brand: GUARDIAN

## (undated) DEVICE — SPONGE HEMOSTAT CELLULS 4X8IN -- SURGICEL

## (undated) DEVICE — GAUZE SPONGES,12 PLY: Brand: CURITY

## (undated) DEVICE — (D)PREP SKN CHLRAPRP APPL 26ML -- CONVERT TO ITEM 371833

## (undated) DEVICE — SYR 3ML LL TIP 1/10ML GRAD --

## (undated) DEVICE — Device

## (undated) DEVICE — 3M™ TEGADERM™ TRANSPARENT FILM DRESSING FRAME STYLE, 1624W, 2-3/8 IN X 2-3/4 IN (6 CM X 7 CM), 100/CT 4CT/CASE: Brand: 3M™ TEGADERM™

## (undated) DEVICE — SPONGE LAP 18X18IN STRL -- 5/PK

## (undated) DEVICE — NDL HYPO REG BVL RW 22GX1IN --

## (undated) DEVICE — 3M™ TEGADERM™ TRANSPARENT FILM DRESSING FRAME STYLE, 1626W, 4 IN X 4-3/4 IN (10 CM X 12 CM), 50/CT 4CT/CASE: Brand: 3M™ TEGADERM™

## (undated) DEVICE — REM POLYHESIVE ADULT PATIENT RETURN ELECTRODE: Brand: VALLEYLAB

## (undated) DEVICE — KENDALL SCD EXPRESS SLEEVES, KNEE LENGTH, MEDIUM: Brand: KENDALL SCD

## (undated) DEVICE — SPONGE GZ W4XL4IN COT 12 PLY TYP VII WVN C FLD DSGN

## (undated) DEVICE — BULB SYRINGE, IRRIGATION WITH PROTECTIVE CAP, 60 CC, INDIVIDUALLY WRAPPED: Brand: DOVER

## (undated) DEVICE — JELLY,LUBE,STERILE,FLIP TOP,TUBE,4-OZ: Brand: MEDLINE

## (undated) DEVICE — SOLUTION IV 1000ML 0.9% SOD CHL

## (undated) DEVICE — X-RAY SPONGES,16 PLY: Brand: DERMACEA

## (undated) DEVICE — SUT CHRMC 3-0 27IN SH BRN --

## (undated) DEVICE — DRAPE,REIN 53X77,STERILE: Brand: MEDLINE

## (undated) DEVICE — HANDLE LT SNAP ON ULT DURABLE LENS FOR TRUMPF ALC DISPOSABLE

## (undated) DEVICE — DEVON™ KNEE AND BODY STRAP 60" X 3" (1.5 M X 7.6 CM): Brand: DEVON

## (undated) DEVICE — SUTURE PROL SZ 5-0 L24IN NONABSORBABLE BLU RB-2 L13IN 1/2 8554H

## (undated) DEVICE — DISH PETRI W/LID STRL -- MIN CASE ORDER IS 2 CA

## (undated) DEVICE — INFECTION CONTROL KIT SYS

## (undated) DEVICE — PAD,PREPPING,CUFFED,24X48,7",NONSTERILE: Brand: MEDLINE

## (undated) DEVICE — SUTURE NONABSORBABLE MONOFILAMENT CV-6 TTC-9 24 IN GORTX 6K02B

## (undated) DEVICE — WASH CLOTH INCONT LO LINT PREM 12X13 IN LF DISP

## (undated) DEVICE — SUTURE PERMAHAND SZ 3-0 L30IN NONABSORBABLE BLK SILK BRAID A304H

## (undated) DEVICE — TUBING, SUCTION, 9/32" X 10', STRAIGHT: Brand: MEDLINE

## (undated) DEVICE — PART NUMBER 108260, VTI 8 MHZ DISPOSABLE DOPPLER PROBE, STRAIGHT, BOX: Brand: VTI 8 MHZ DISPOSABLE DOPPLER PROBE, STRAIGHT, BOX

## (undated) DEVICE — SUT PROL 6-0 18IN BV1 DA BLU --

## (undated) DEVICE — SUT ETHLN 3-0 18IN PS1 BLK --